# Patient Record
Sex: FEMALE | Race: WHITE | NOT HISPANIC OR LATINO
[De-identification: names, ages, dates, MRNs, and addresses within clinical notes are randomized per-mention and may not be internally consistent; named-entity substitution may affect disease eponyms.]

---

## 2017-12-08 ENCOUNTER — RESULT REVIEW (OUTPATIENT)
Age: 57
End: 2017-12-08

## 2018-04-18 ENCOUNTER — APPOINTMENT (OUTPATIENT)
Dept: GASTROENTEROLOGY | Facility: CLINIC | Age: 58
End: 2018-04-18
Payer: COMMERCIAL

## 2018-04-18 VITALS
BODY MASS INDEX: 18.31 KG/M2 | WEIGHT: 97 LBS | SYSTOLIC BLOOD PRESSURE: 116 MMHG | DIASTOLIC BLOOD PRESSURE: 84 MMHG | HEIGHT: 61 IN | HEART RATE: 57 BPM | OXYGEN SATURATION: 98 %

## 2018-04-18 DIAGNOSIS — K58.1 IRRITABLE BOWEL SYNDROME WITH CONSTIPATION: ICD-10-CM

## 2018-04-18 DIAGNOSIS — R14.0 ABDOMINAL DISTENSION (GASEOUS): ICD-10-CM

## 2018-04-18 PROCEDURE — 99204 OFFICE O/P NEW MOD 45 MIN: CPT

## 2018-04-24 ENCOUNTER — CHART COPY (OUTPATIENT)
Age: 58
End: 2018-04-24

## 2018-04-25 ENCOUNTER — CHART COPY (OUTPATIENT)
Age: 58
End: 2018-04-25

## 2018-06-29 ENCOUNTER — APPOINTMENT (OUTPATIENT)
Dept: GASTROENTEROLOGY | Facility: CLINIC | Age: 58
End: 2018-06-29

## 2018-09-27 ENCOUNTER — APPOINTMENT (OUTPATIENT)
Dept: GASTROENTEROLOGY | Facility: CLINIC | Age: 58
End: 2018-09-27

## 2019-04-25 ENCOUNTER — APPOINTMENT (OUTPATIENT)
Dept: GASTROENTEROLOGY | Facility: CLINIC | Age: 59
End: 2019-04-25
Payer: COMMERCIAL

## 2019-04-25 VITALS
BODY MASS INDEX: 18.12 KG/M2 | WEIGHT: 96 LBS | OXYGEN SATURATION: 98 % | DIASTOLIC BLOOD PRESSURE: 62 MMHG | HEIGHT: 61 IN | SYSTOLIC BLOOD PRESSURE: 98 MMHG | RESPIRATION RATE: 14 BRPM | HEART RATE: 67 BPM

## 2019-04-25 DIAGNOSIS — Z12.11 ENCOUNTER FOR SCREENING FOR MALIGNANT NEOPLASM OF COLON: ICD-10-CM

## 2019-04-25 DIAGNOSIS — K59.09 OTHER CONSTIPATION: ICD-10-CM

## 2019-04-25 PROCEDURE — 99214 OFFICE O/P EST MOD 30 MIN: CPT

## 2019-04-25 RX ORDER — SODIUM SULFATE, POTASSIUM SULFATE, MAGNESIUM SULFATE 17.5; 3.13; 1.6 G/ML; G/ML; G/ML
17.5-3.13-1.6 SOLUTION, CONCENTRATE ORAL
Qty: 1 | Refills: 0 | Status: ACTIVE | COMMUNITY
Start: 2019-04-25 | End: 1900-01-01

## 2019-04-25 RX ORDER — LINACLOTIDE 145 UG/1
145 CAPSULE, GELATIN COATED ORAL
Qty: 30 | Refills: 3 | Status: DISCONTINUED | COMMUNITY
Start: 2018-04-23 | End: 2019-04-25

## 2019-04-25 NOTE — REASON FOR VISIT
[Follow-Up: _____] : a [unfilled] follow-up visit [FreeTextEntry1] : Chronic constipation\par Screening evaluation

## 2019-04-25 NOTE — ASSESSMENT
[FreeTextEntry1] : Improved chronic constipation\par Average risk for colon cancer and polyps\par \par Plan\par Indications risks benefits and alternatives to screening colonoscopy reviewed\par Patient agreeable to examination\par Continue bowel regimen as above

## 2019-04-25 NOTE — HISTORY OF PRESENT ILLNESS
[de-identified] : 59-year-old female retained followup chronic constipation. Since the last office visit patient did attempt therapeutic trial linzess 145.  Cause diarrhea patient discontinued\par Since that time patient has continued to experiment with other remedies, recently having a great deal of success with daily magnesium supplementation 5 mg over-the-counter along with CBD/THC\par Reporting bowel movement every day\par No longer feeling gassy or bloated\par \par Last colonoscopy almost 10 years ago Dr Gonzales\par fair prep\par Redundant colon

## 2019-04-25 NOTE — PHYSICAL EXAM
[General Appearance - Alert] : alert [Sclera] : the sclera and conjunctiva were normal [General Appearance - In No Acute Distress] : in no acute distress [Extraocular Movements] : extraocular movements were intact [Outer Ear] : the ears and nose were normal in appearance [PERRL With Normal Accommodation] : pupils were equal in size, round, and reactive to light [Neck Cervical Mass (___cm)] : no neck mass was observed [Oropharynx] : the oropharynx was normal [Neck Appearance] : the appearance of the neck was normal [Jugular Venous Distention Increased] : there was no jugular-venous distention [Thyroid Diffuse Enlargement] : the thyroid was not enlarged [Thyroid Nodule] : there were no palpable thyroid nodules [Auscultation Breath Sounds / Voice Sounds] : lungs were clear to auscultation bilaterally [Heart Rate And Rhythm] : heart rate was normal and rhythm regular [Heart Sounds Gallop] : no gallops [Heart Sounds] : normal S1 and S2 [Murmurs] : no murmurs [Heart Sounds Pericardial Friction Rub] : no pericardial rub [Edema] : there was no peripheral edema [Abdomen Soft] : soft [Bowel Sounds] : normal bowel sounds [Abdomen Tenderness] : non-tender [Oriented To Time, Place, And Person] : oriented to person, place, and time [Abdomen Mass (___ Cm)] : no abdominal mass palpated [] : no hepato-splenomegaly [Affect] : the affect was normal [Impaired Insight] : insight and judgment were intact

## 2019-10-28 ENCOUNTER — APPOINTMENT (OUTPATIENT)
Dept: GASTROENTEROLOGY | Facility: HOSPITAL | Age: 59
End: 2019-10-28

## 2020-07-31 ENCOUNTER — APPOINTMENT (OUTPATIENT)
Dept: GASTROENTEROLOGY | Facility: CLINIC | Age: 60
End: 2020-07-31
Payer: COMMERCIAL

## 2020-07-31 VITALS
TEMPERATURE: 98.5 F | HEART RATE: 70 BPM | BODY MASS INDEX: 18.24 KG/M2 | DIASTOLIC BLOOD PRESSURE: 60 MMHG | WEIGHT: 96.6 LBS | OXYGEN SATURATION: 98 % | HEIGHT: 61 IN | SYSTOLIC BLOOD PRESSURE: 99 MMHG | RESPIRATION RATE: 16 BRPM

## 2020-07-31 DIAGNOSIS — R11.0 NAUSEA: ICD-10-CM

## 2020-07-31 PROCEDURE — 99214 OFFICE O/P EST MOD 30 MIN: CPT

## 2020-07-31 RX ORDER — FAMOTIDINE 40 MG/1
40 TABLET, FILM COATED ORAL
Qty: 30 | Refills: 0 | Status: ACTIVE | COMMUNITY
Start: 2020-07-31 | End: 1900-01-01

## 2020-07-31 NOTE — HISTORY OF PRESENT ILLNESS
[de-identified] : dictation inactive\par \par 60 yr female, IBS\par recent nasuea, improving\par chest fullness without burning, without dysphagia\par 5 pound weight loss\par \par BM ok with daily mg\par No longer using CBD

## 2020-07-31 NOTE — ASSESSMENT
[FreeTextEntry1] : nausea, chest fullness\par suspect reflux\par \par Plan\par trial of FDgard (samples given)\par If unsuccessful, trial famotidine 40mg (ordered)\par Phone follow up in several weeks

## 2020-07-31 NOTE — PHYSICAL EXAM
[General Appearance - In No Acute Distress] : in no acute distress [General Appearance - Alert] : alert [Sclera] : the sclera and conjunctiva were normal [Outer Ear] : the ears and nose were normal in appearance [PERRL With Normal Accommodation] : pupils were equal in size, round, and reactive to light [Extraocular Movements] : extraocular movements were intact [Oropharynx] : the oropharynx was normal [Neck Appearance] : the appearance of the neck was normal [Neck Cervical Mass (___cm)] : no neck mass was observed [Jugular Venous Distention Increased] : there was no jugular-venous distention [Thyroid Diffuse Enlargement] : the thyroid was not enlarged [Thyroid Nodule] : there were no palpable thyroid nodules [Heart Rate And Rhythm] : heart rate was normal and rhythm regular [Auscultation Breath Sounds / Voice Sounds] : lungs were clear to auscultation bilaterally [Heart Sounds Pericardial Friction Rub] : no pericardial rub [Murmurs] : no murmurs [Heart Sounds] : normal S1 and S2 [Heart Sounds Gallop] : no gallops [Bowel Sounds] : normal bowel sounds [Edema] : there was no peripheral edema [Abdomen Soft] : soft [] : no hepato-splenomegaly [Abdomen Mass (___ Cm)] : no abdominal mass palpated [Abdomen Tenderness] : non-tender [Affect] : the affect was normal [Oriented To Time, Place, And Person] : oriented to person, place, and time [Impaired Insight] : insight and judgment were intact

## 2020-12-21 PROBLEM — Z12.11 ENCOUNTER FOR SCREENING COLONOSCOPY: Status: RESOLVED | Noted: 2019-04-25 | Resolved: 2020-12-21

## 2023-01-03 ENCOUNTER — OFFICE (OUTPATIENT)
Dept: URBAN - METROPOLITAN AREA CLINIC 109 | Facility: CLINIC | Age: 63
Setting detail: OPHTHALMOLOGY
End: 2023-01-03
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
## 2023-01-03 PROCEDURE — NO SHOW FE NO SHOW FEE: Performed by: OPHTHALMOLOGY

## 2023-02-01 ENCOUNTER — OFFICE (OUTPATIENT)
Dept: URBAN - METROPOLITAN AREA CLINIC 109 | Facility: CLINIC | Age: 63
Setting detail: OPHTHALMOLOGY
End: 2023-02-01
Payer: COMMERCIAL

## 2023-02-01 DIAGNOSIS — H40.013: ICD-10-CM

## 2023-02-01 DIAGNOSIS — H16.223: ICD-10-CM

## 2023-02-01 PROCEDURE — 92250 FUNDUS PHOTOGRAPHY W/I&R: CPT | Performed by: OPHTHALMOLOGY

## 2023-02-01 PROCEDURE — 92014 COMPRE OPH EXAM EST PT 1/>: CPT | Performed by: OPHTHALMOLOGY

## 2023-02-01 ASSESSMENT — REFRACTION_AUTOREFRACTION
OS_SPHERE: +0.25
OD_SPHERE: +1.25
OD_CYLINDER: -0.75
OD_AXIS: 60

## 2023-02-01 ASSESSMENT — CONFRONTATIONAL VISUAL FIELD TEST (CVF)
OD_FINDINGS: FULL
OS_FINDINGS: FULL

## 2023-02-01 ASSESSMENT — PACHYMETRY
OD_CT_CORRECTION: -3
OD_CT_UM: 584
OS_CT_CORRECTION: -2
OS_CT_UM: 578

## 2023-02-01 ASSESSMENT — TONOMETRY
OD_IOP_MMHG: 17
OS_IOP_MMHG: 17

## 2023-02-01 ASSESSMENT — SUPERFICIAL PUNCTATE KERATITIS (SPK)
OD_SPK: 2+
OS_SPK: 3+

## 2023-02-01 ASSESSMENT — SPHEQUIV_DERIVED: OD_SPHEQUIV: 0.875

## 2023-02-01 ASSESSMENT — VISUAL ACUITY
OS_BCVA: 20/25-2
OD_BCVA: 20/20-2

## 2023-04-26 ENCOUNTER — OFFICE (OUTPATIENT)
Dept: URBAN - METROPOLITAN AREA CLINIC 109 | Facility: CLINIC | Age: 63
Setting detail: OPHTHALMOLOGY
End: 2023-04-26
Payer: COMMERCIAL

## 2023-04-26 DIAGNOSIS — H40.013: ICD-10-CM

## 2023-04-26 DIAGNOSIS — H02.89: ICD-10-CM

## 2023-04-26 DIAGNOSIS — H16.223: ICD-10-CM

## 2023-04-26 PROCEDURE — 92133 CPTRZD OPH DX IMG PST SGM ON: CPT | Performed by: OPHTHALMOLOGY

## 2023-04-26 PROCEDURE — 99213 OFFICE O/P EST LOW 20 MIN: CPT | Performed by: OPHTHALMOLOGY

## 2023-04-26 ASSESSMENT — SUPERFICIAL PUNCTATE KERATITIS (SPK)
OS_SPK: 3+
OD_SPK: 2+

## 2023-04-26 ASSESSMENT — PACHYMETRY
OS_CT_CORRECTION: -2
OD_CT_UM: 584
OS_CT_UM: 578
OD_CT_CORRECTION: -3

## 2023-04-26 ASSESSMENT — REFRACTION_AUTOREFRACTION
OD_AXIS: 60
OD_SPHERE: +1.25
OD_CYLINDER: -0.75
OS_SPHERE: +0.25

## 2023-04-26 ASSESSMENT — TONOMETRY
OD_IOP_MMHG: 16
OS_IOP_MMHG: 16

## 2023-04-26 ASSESSMENT — VISUAL ACUITY
OS_BCVA: 20/25-2
OD_BCVA: 20/20-2

## 2023-04-26 ASSESSMENT — CONFRONTATIONAL VISUAL FIELD TEST (CVF)
OD_FINDINGS: FULL
OS_FINDINGS: FULL

## 2023-04-26 ASSESSMENT — SPHEQUIV_DERIVED: OD_SPHEQUIV: 0.875

## 2024-01-02 ENCOUNTER — APPOINTMENT (OUTPATIENT)
Dept: OTOLARYNGOLOGY | Facility: CLINIC | Age: 64
End: 2024-01-02

## 2024-05-09 ENCOUNTER — EMERGENCY (EMERGENCY)
Facility: HOSPITAL | Age: 64
LOS: 1 days | Discharge: HOME | End: 2024-05-09
Attending: STUDENT IN AN ORGANIZED HEALTH CARE EDUCATION/TRAINING PROGRAM | Admitting: STUDENT IN AN ORGANIZED HEALTH CARE EDUCATION/TRAINING PROGRAM
Payer: COMMERCIAL

## 2024-05-09 VITALS
RESPIRATION RATE: 16 BRPM | HEIGHT: 61 IN | DIASTOLIC BLOOD PRESSURE: 87 MMHG | TEMPERATURE: 98 F | WEIGHT: 95.9 LBS | OXYGEN SATURATION: 100 % | HEART RATE: 73 BPM | SYSTOLIC BLOOD PRESSURE: 134 MMHG

## 2024-05-09 LAB
ALBUMIN SERPL ELPH-MCNC: 3.6 G/DL — SIGNIFICANT CHANGE UP (ref 3.3–5)
ALP SERPL-CCNC: 62 U/L — SIGNIFICANT CHANGE UP (ref 40–120)
ALT FLD-CCNC: 36 U/L — SIGNIFICANT CHANGE UP (ref 12–78)
ANION GAP SERPL CALC-SCNC: 5 MMOL/L — SIGNIFICANT CHANGE UP (ref 5–17)
AST SERPL-CCNC: 43 U/L — HIGH (ref 15–37)
BASOPHILS # BLD AUTO: 0.05 K/UL — SIGNIFICANT CHANGE UP (ref 0–0.2)
BASOPHILS NFR BLD AUTO: 0.9 % — SIGNIFICANT CHANGE UP (ref 0–2)
BILIRUB SERPL-MCNC: 0.4 MG/DL — SIGNIFICANT CHANGE UP (ref 0.2–1.2)
BUN SERPL-MCNC: 33 MG/DL — HIGH (ref 7–23)
CALCIUM SERPL-MCNC: 9.3 MG/DL — SIGNIFICANT CHANGE UP (ref 8.5–10.1)
CHLORIDE SERPL-SCNC: 104 MMOL/L — SIGNIFICANT CHANGE UP (ref 96–108)
CK SERPL-CCNC: 137 U/L — SIGNIFICANT CHANGE UP (ref 26–192)
CO2 SERPL-SCNC: 26 MMOL/L — SIGNIFICANT CHANGE UP (ref 22–31)
CREAT SERPL-MCNC: 0.93 MG/DL — SIGNIFICANT CHANGE UP (ref 0.5–1.3)
EGFR: 68 ML/MIN/1.73M2 — SIGNIFICANT CHANGE UP
EOSINOPHIL # BLD AUTO: 0.04 K/UL — SIGNIFICANT CHANGE UP (ref 0–0.5)
EOSINOPHIL NFR BLD AUTO: 0.7 % — SIGNIFICANT CHANGE UP (ref 0–6)
GLUCOSE SERPL-MCNC: 103 MG/DL — HIGH (ref 70–99)
HCT VFR BLD CALC: 44.3 % — SIGNIFICANT CHANGE UP (ref 34.5–45)
HGB BLD-MCNC: 15.1 G/DL — SIGNIFICANT CHANGE UP (ref 11.5–15.5)
IMM GRANULOCYTES NFR BLD AUTO: 0.5 % — SIGNIFICANT CHANGE UP (ref 0–0.9)
LYMPHOCYTES # BLD AUTO: 0.86 K/UL — LOW (ref 1–3.3)
LYMPHOCYTES # BLD AUTO: 14.7 % — SIGNIFICANT CHANGE UP (ref 13–44)
MCHC RBC-ENTMCNC: 32.5 PG — SIGNIFICANT CHANGE UP (ref 27–34)
MCHC RBC-ENTMCNC: 34.1 GM/DL — SIGNIFICANT CHANGE UP (ref 32–36)
MCV RBC AUTO: 95.3 FL — SIGNIFICANT CHANGE UP (ref 80–100)
MONOCYTES # BLD AUTO: 0.4 K/UL — SIGNIFICANT CHANGE UP (ref 0–0.9)
MONOCYTES NFR BLD AUTO: 6.8 % — SIGNIFICANT CHANGE UP (ref 2–14)
NEUTROPHILS # BLD AUTO: 4.46 K/UL — SIGNIFICANT CHANGE UP (ref 1.8–7.4)
NEUTROPHILS NFR BLD AUTO: 76.4 % — SIGNIFICANT CHANGE UP (ref 43–77)
NRBC # BLD: 0 /100 WBCS — SIGNIFICANT CHANGE UP (ref 0–0)
PLATELET # BLD AUTO: 180 K/UL — SIGNIFICANT CHANGE UP (ref 150–400)
POTASSIUM SERPL-MCNC: 4.4 MMOL/L — SIGNIFICANT CHANGE UP (ref 3.5–5.3)
POTASSIUM SERPL-SCNC: 4.4 MMOL/L — SIGNIFICANT CHANGE UP (ref 3.5–5.3)
PROT SERPL-MCNC: 7.2 G/DL — SIGNIFICANT CHANGE UP (ref 6–8.3)
RBC # BLD: 4.65 M/UL — SIGNIFICANT CHANGE UP (ref 3.8–5.2)
RBC # FLD: 12.2 % — SIGNIFICANT CHANGE UP (ref 10.3–14.5)
SODIUM SERPL-SCNC: 135 MMOL/L — SIGNIFICANT CHANGE UP (ref 135–145)
WBC # BLD: 5.84 K/UL — SIGNIFICANT CHANGE UP (ref 3.8–10.5)
WBC # FLD AUTO: 5.84 K/UL — SIGNIFICANT CHANGE UP (ref 3.8–10.5)

## 2024-05-09 PROCEDURE — 70450 CT HEAD/BRAIN W/O DYE: CPT | Mod: MC

## 2024-05-09 PROCEDURE — 99284 EMERGENCY DEPT VISIT MOD MDM: CPT | Mod: 25

## 2024-05-09 PROCEDURE — 73130 X-RAY EXAM OF HAND: CPT | Mod: 26,RT

## 2024-05-09 PROCEDURE — 86850 RBC ANTIBODY SCREEN: CPT

## 2024-05-09 PROCEDURE — 73552 X-RAY EXAM OF FEMUR 2/>: CPT | Mod: 26,50

## 2024-05-09 PROCEDURE — 71260 CT THORAX DX C+: CPT | Mod: 26,MC

## 2024-05-09 PROCEDURE — 85025 COMPLETE CBC W/AUTO DIFF WBC: CPT

## 2024-05-09 PROCEDURE — 73130 X-RAY EXAM OF HAND: CPT

## 2024-05-09 PROCEDURE — 73030 X-RAY EXAM OF SHOULDER: CPT | Mod: 26,LT

## 2024-05-09 PROCEDURE — 82550 ASSAY OF CK (CPK): CPT

## 2024-05-09 PROCEDURE — 74177 CT ABD & PELVIS W/CONTRAST: CPT | Mod: MC

## 2024-05-09 PROCEDURE — 72125 CT NECK SPINE W/O DYE: CPT | Mod: MC

## 2024-05-09 PROCEDURE — 73030 X-RAY EXAM OF SHOULDER: CPT

## 2024-05-09 PROCEDURE — 72125 CT NECK SPINE W/O DYE: CPT | Mod: 26,MC

## 2024-05-09 PROCEDURE — 71260 CT THORAX DX C+: CPT | Mod: MC

## 2024-05-09 PROCEDURE — 80053 COMPREHEN METABOLIC PANEL: CPT

## 2024-05-09 PROCEDURE — 99285 EMERGENCY DEPT VISIT HI MDM: CPT

## 2024-05-09 PROCEDURE — 36415 COLL VENOUS BLD VENIPUNCTURE: CPT

## 2024-05-09 PROCEDURE — 73552 X-RAY EXAM OF FEMUR 2/>: CPT

## 2024-05-09 PROCEDURE — 74177 CT ABD & PELVIS W/CONTRAST: CPT | Mod: 26,MC

## 2024-05-09 PROCEDURE — 86901 BLOOD TYPING SEROLOGIC RH(D): CPT

## 2024-05-09 PROCEDURE — 86900 BLOOD TYPING SEROLOGIC ABO: CPT

## 2024-05-09 PROCEDURE — 70450 CT HEAD/BRAIN W/O DYE: CPT | Mod: 26,MC

## 2024-05-09 RX ORDER — SODIUM CHLORIDE 9 MG/ML
1000 INJECTION INTRAMUSCULAR; INTRAVENOUS; SUBCUTANEOUS ONCE
Refills: 0 | Status: COMPLETED | OUTPATIENT
Start: 2024-05-09 | End: 2024-05-09

## 2024-05-09 RX ORDER — TETANUS TOXOID, REDUCED DIPHTHERIA TOXOID AND ACELLULAR PERTUSSIS VACCINE, ADSORBED 5; 2.5; 8; 8; 2.5 [IU]/.5ML; [IU]/.5ML; UG/.5ML; UG/.5ML; UG/.5ML
0.5 SUSPENSION INTRAMUSCULAR ONCE
Refills: 0 | Status: DISCONTINUED | OUTPATIENT
Start: 2024-05-09 | End: 2024-05-13

## 2024-05-09 RX ADMIN — SODIUM CHLORIDE 1000 MILLILITER(S): 9 INJECTION INTRAMUSCULAR; INTRAVENOUS; SUBCUTANEOUS at 19:32
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
## 2024-05-09 NOTE — ED ADULT NURSE REASSESSMENT NOTE - NS ED NURSE REASSESS COMMENT FT1
Pt received from (  Oscar ) nurse at this time. Pt A&Ox4 on room air. No complaints of pain or discomfort. No acute distress. Vitals remain stable. Tele monitor in place, visualized on monitor with tech. Oriented to room. Call bell within reach. Pt resting comfortably, will continue to monitor.
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
## 2024-05-09 NOTE — ED PROVIDER NOTE - IV ALTEPLASE EXCL REL HIDDEN
show
You can access the FollowMyHealth Patient Portal offered by [facility] by registering at the following website: [email or web address]. By joining [facility]’s FollowMyHealth portal, you will also be able to view your health information using other applications (apps) compatible with our system.

## 2024-05-09 NOTE — ED PROVIDER NOTE - PATIENT PORTAL LINK FT
You can access the FollowMyHealth Patient Portal offered by [facility] by registering at the following website: [email or web address]. By joining [facility]’s FollowMyHealth portal, you will also be able to view your health information using other applications (apps) compatible with our system.
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
## 2024-05-09 NOTE — ED ADULT TRIAGE NOTE - TEMPERATURE IN CELSIUS (DEGREES C)
Please let the patient know I reviewed her x-ray results and she does have some degenerative changes in the form of disc height loss and arthritis.  As discussed during our visit we will order a cervical spine MRI if her symptoms do not improve with physical therapy and gabapentin.   36.5

## 2024-05-09 NOTE — ED PROVIDER NOTE - CARE PROVIDER_API CALL
Ankita Cole George  Internal Medicine  [address] [place] [id number]  Phone: [phone]  Fax: [phone]  Follow Up Time: 4-6 Days

## 2024-05-09 NOTE — ED PROVIDER NOTE - WR INTERPRETATION DATE TIME  1
Cellcept Pregnancy And Lactation Text: This medication is Pregnancy Category D and isn't considered safe during pregnancy. It is unknown if this medication is excreted in breast milk. 09-May-2024 19:17

## 2024-05-09 NOTE — ED PROVIDER NOTE - PROGRESS NOTE DETAILS
Patient reevaluated, feels well at this time with no new pain.  Imaging negative for acute traumatic pathology.  Found to have concern for lymphoma.  Discussed with patient at bedside.  Message placed to cancer care direct group.  I discussed that x-ray reads will be official tomorrow morning but at this time I do not see any acute fracture.  Will plan for discharge at this time.  Plan to discharge patient. Return to ED precautions were discussed with the patient/family. All questions were answered. Maxwell Bear MD.

## 2024-05-09 NOTE — ED ADULT NURSE NOTE - NSFALLUNIVINTERV_ED_ALL_ED
Bed/Stretcher in lowest position, wheels locked, appropriate side rails in place/Call bell, personal items and telephone in reach/Instruct patient to call for assistance before getting out of bed/chair/stretcher/Non-slip footwear applied when patient is off stretcher/[place] to call system/Physically safe environment - no spills, clutter or unnecessary equipment/Purposeful proactive rounding/Room/bathroom lighting operational, light cord in reach

## 2024-05-13 ENCOUNTER — NON-APPOINTMENT (OUTPATIENT)
Age: 64
End: 2024-05-13

## 2024-05-17 ENCOUNTER — OUTPATIENT (OUTPATIENT)
Dept: OUTPATIENT SERVICES | Facility: HOSPITAL | Age: 64
LOS: 1 days | Discharge: HOME | End: 2024-05-17

## 2024-05-17 DIAGNOSIS — C85.90 NON-HODGKIN LYMPHOMA, UNSPECIFIED, UNSPECIFIED SITE: ICD-10-CM

## 2024-05-20 ENCOUNTER — RESULT REVIEW (OUTPATIENT)
Age: 64
End: 2024-05-20

## 2024-05-20 ENCOUNTER — APPOINTMENT (OUTPATIENT)
Dept: HEMATOLOGY ONCOLOGY | Facility: CLINIC | Age: 64
End: 2024-05-20
Payer: COMMERCIAL

## 2024-05-20 VITALS
OXYGEN SATURATION: 99 % | BODY MASS INDEX: 18.54 KG/M2 | DIASTOLIC BLOOD PRESSURE: 75 MMHG | SYSTOLIC BLOOD PRESSURE: 131 MMHG | WEIGHT: 98.08 LBS | TEMPERATURE: 97.2 F | RESPIRATION RATE: 17 BRPM | HEART RATE: 59 BPM

## 2024-05-20 LAB
ALBUMIN SERPL ELPH-MCNC: 4.4 G/DL
ALP BLD-CCNC: 73 U/L
ALT SERPL-CCNC: 23 U/L
ANION GAP SERPL CALC-SCNC: 12 MMOL/L
APTT BLD: 34 SEC
AST SERPL-CCNC: 24 U/L
B2 MICROGLOB SERPL-MCNC: 1.9 MG/L
BASOPHILS # BLD AUTO: 0.02 K/UL — SIGNIFICANT CHANGE UP (ref 0–0.2)
BASOPHILS NFR BLD AUTO: 0.5 % — SIGNIFICANT CHANGE UP (ref 0–2)
BILIRUB SERPL-MCNC: 0.2 MG/DL
BUN SERPL-MCNC: 22 MG/DL
CALCIUM SERPL-MCNC: 9.4 MG/DL
CHLORIDE SERPL-SCNC: 104 MMOL/L
CO2 SERPL-SCNC: 25 MMOL/L
CREAT SERPL-MCNC: 0.76 MG/DL
EGFR: 87 ML/MIN/1.73M2
EOSINOPHIL # BLD AUTO: 0.03 K/UL — SIGNIFICANT CHANGE UP (ref 0–0.5)
EOSINOPHIL NFR BLD AUTO: 0.8 % — SIGNIFICANT CHANGE UP (ref 0–6)
ERYTHROCYTE [SEDIMENTATION RATE] IN BLOOD BY WESTERGREN METHOD: 33 MM/HR
GLUCOSE SERPL-MCNC: 87 MG/DL
HCT VFR BLD CALC: 42.8 % — SIGNIFICANT CHANGE UP (ref 34.5–45)
HGB BLD-MCNC: 14.4 G/DL — SIGNIFICANT CHANGE UP (ref 11.5–15.5)
IMM GRANULOCYTES NFR BLD AUTO: 0 % — SIGNIFICANT CHANGE UP (ref 0–0.9)
INR PPP: 0.92 RATIO
LDH SERPL-CCNC: 270 U/L
LYMPHOCYTES # BLD AUTO: 0.7 K/UL — LOW (ref 1–3.3)
LYMPHOCYTES # BLD AUTO: 18.2 % — SIGNIFICANT CHANGE UP (ref 13–44)
MCHC RBC-ENTMCNC: 32.7 PG — SIGNIFICANT CHANGE UP (ref 27–34)
MCHC RBC-ENTMCNC: 33.6 G/DL — SIGNIFICANT CHANGE UP (ref 32–36)
MCV RBC AUTO: 97.1 FL — SIGNIFICANT CHANGE UP (ref 80–100)
MONOCYTES # BLD AUTO: 0.33 K/UL — SIGNIFICANT CHANGE UP (ref 0–0.9)
MONOCYTES NFR BLD AUTO: 8.6 % — SIGNIFICANT CHANGE UP (ref 2–14)
NEUTROPHILS # BLD AUTO: 2.76 K/UL — SIGNIFICANT CHANGE UP (ref 1.8–7.4)
NEUTROPHILS NFR BLD AUTO: 71.9 % — SIGNIFICANT CHANGE UP (ref 43–77)
NRBC # BLD: 0 /100 WBCS — SIGNIFICANT CHANGE UP (ref 0–0)
PLATELET # BLD AUTO: 198 K/UL — SIGNIFICANT CHANGE UP (ref 150–400)
POTASSIUM SERPL-SCNC: 4.1 MMOL/L
PROT SERPL-MCNC: 6.7 G/DL
PT BLD: 10.4 SEC
RBC # BLD: 4.41 M/UL — SIGNIFICANT CHANGE UP (ref 3.8–5.2)
RBC # FLD: 12.3 % — SIGNIFICANT CHANGE UP (ref 10.3–14.5)
SODIUM SERPL-SCNC: 140 MMOL/L
WBC # BLD: 3.84 K/UL — SIGNIFICANT CHANGE UP (ref 3.8–10.5)
WBC # FLD AUTO: 3.84 K/UL — SIGNIFICANT CHANGE UP (ref 3.8–10.5)

## 2024-05-20 PROCEDURE — 99205 OFFICE O/P NEW HI 60 MIN: CPT

## 2024-05-21 NOTE — PHYSICAL EXAM
[Fully active, able to carry on all pre-disease performance without restriction] : Status 0 - Fully active, able to carry on all pre-disease performance without restriction [Thin] : thin [Normal] : normal spine exam without palpable tenderness, no kyphosis or scoliosis

## 2024-05-23 NOTE — CONSULT LETTER
[Dear  ___] : Dear  [unfilled], [Consult Letter:] : I had the pleasure of evaluating your patient, [unfilled]. [Please see my note below.] : Please see my note below. [Consult Closing:] : Thank you very much for allowing me to participate in the care of this patient.  If you have any questions, please do not hesitate to contact me. [Sincerely,] : Sincerely, [FreeTextEntry2] : Ankita Cole MD [FreeTextEntry3] : LOYDA GARCIA M.D. Hematology/ Oncology [place] [address] Telephone: [phone] Fax: [phone]

## 2024-05-23 NOTE — REASON FOR VISIT
[Initial Consultation] : an initial consultation for [FreeTextEntry2] : intra-abdominal lymphadenopathy
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
## 2024-05-23 NOTE — ASSESSMENT
[FreeTextEntry1] : Ms. APODACA 's questions were answered to her satisfaction.  She  expressed her  understanding and willingness to comply with the above recommendations, and  will return to the office in 2 weeks.

## 2024-05-23 NOTE — REVIEW OF SYSTEMS
[Patient Intake Form Reviewed] : Patient intake form was reviewed [Diarrhea: Grade 0] : Diarrhea: Grade 0 [Negative] : Allergic/Immunologic [Recent Change In Weight] : ~T recent weight change [FreeTextEntry2] : Recent weight loss

## 2024-05-30 ENCOUNTER — APPOINTMENT (OUTPATIENT)
Dept: NUCLEAR MEDICINE | Facility: IMAGING CENTER | Age: 64
End: 2024-05-30
Payer: COMMERCIAL

## 2024-05-30 ENCOUNTER — OUTPATIENT (OUTPATIENT)
Dept: OUTPATIENT SERVICES | Facility: HOSPITAL | Age: 64
LOS: 1 days | End: 2024-05-30
Payer: COMMERCIAL

## 2024-05-30 DIAGNOSIS — C85.90 NON-HODGKIN LYMPHOMA, UNSPECIFIED, UNSPECIFIED SITE: ICD-10-CM

## 2024-05-30 PROCEDURE — 78815 PET IMAGE W/CT SKULL-THIGH: CPT | Mod: 26,PI

## 2024-05-30 PROCEDURE — 78815 PET IMAGE W/CT SKULL-THIGH: CPT

## 2024-05-30 PROCEDURE — A9552: CPT

## 2024-06-03 ENCOUNTER — NON-APPOINTMENT (OUTPATIENT)
Age: 64
End: 2024-06-03

## 2024-06-04 ENCOUNTER — LABORATORY RESULT (OUTPATIENT)
Age: 64
End: 2024-06-04

## 2024-06-04 ENCOUNTER — APPOINTMENT (OUTPATIENT)
Dept: HEMATOLOGY ONCOLOGY | Facility: CLINIC | Age: 64
End: 2024-06-04
Payer: COMMERCIAL

## 2024-06-04 ENCOUNTER — APPOINTMENT (OUTPATIENT)
Dept: HEMATOLOGY ONCOLOGY | Facility: CLINIC | Age: 64
End: 2024-06-04

## 2024-06-04 ENCOUNTER — APPOINTMENT (OUTPATIENT)
Dept: SURGICAL ONCOLOGY | Facility: CLINIC | Age: 64
End: 2024-06-04
Payer: COMMERCIAL

## 2024-06-04 VITALS
DIASTOLIC BLOOD PRESSURE: 85 MMHG | BODY MASS INDEX: 17.91 KG/M2 | WEIGHT: 94.8 LBS | SYSTOLIC BLOOD PRESSURE: 133 MMHG | RESPIRATION RATE: 16 BRPM | TEMPERATURE: 97.2 F | HEART RATE: 55 BPM | OXYGEN SATURATION: 98 %

## 2024-06-04 VITALS
HEIGHT: 61 IN | WEIGHT: 94 LBS | SYSTOLIC BLOOD PRESSURE: 107 MMHG | HEART RATE: 68 BPM | RESPIRATION RATE: 16 BRPM | BODY MASS INDEX: 17.75 KG/M2 | DIASTOLIC BLOOD PRESSURE: 67 MMHG | OXYGEN SATURATION: 97 %

## 2024-06-04 DIAGNOSIS — R59.1 GENERALIZED ENLARGED LYMPH NODES: ICD-10-CM

## 2024-06-04 PROCEDURE — 85097 BONE MARROW INTERPRETATION: CPT

## 2024-06-04 PROCEDURE — 99205 OFFICE O/P NEW HI 60 MIN: CPT
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
## 2024-06-04 NOTE — PHYSICAL EXAM
[Normal] : supple, no neck mass and thyroid not enlarged [Normal Neck Lymph Nodes] : normal neck lymph nodes  [Normal Supraclavicular Lymph Nodes] : normal supraclavicular lymph nodes [Normal Groin Lymph Nodes] : normal groin lymph nodes [Normal Axillary Lymph Nodes] : normal axillary lymph nodes [Normal] : oriented to person, place and time, with appropriate affect [de-identified] : Bilateral soft appearing axillar lymph nodes left greater than right.

## 2024-06-04 NOTE — ASSESSMENT
[FreeTextEntry1] :  We discussed the need for a left axillary lymph node excisional biopsy.  We discussed the risks, benefits and alternatives of the procedure.  We also discussed post operative expectations and possible complications.  Monica and her son express  understanding and agree to proceed.  We also discussed the consideration of a robotic mesenteric mass sampling if the results of the axillary lymph node biopsy were inconclusive    All medical entries were at my, Dr. Adán Voss, direction. I have reviewed the chart and agree that the record accurately reflects my personal performance of the history, physical exam, assessment, and plan.  Our office nurse practitioner was present for the duration of the office visit.

## 2024-06-04 NOTE — CONSULT LETTER
[Dear  ___] : Dear  [unfilled], [Consult Letter:] : I had the pleasure of evaluating your patient, [unfilled]. [Please see my note below.] : Please see my note below. [Consult Closing:] : Thank you very much for allowing me to participate in the care of this patient.  If you have any questions, please do not hesitate to contact me. [Sincerely,] : Sincerely, [FreeTextEntry2] : Tru Eugene MD [FreeTextEntry3] : Adán Voss MD Surgical Oncology [facility] Office: [phone] Cell: [phone]

## 2024-06-05 PROBLEM — R59.1 LYMPHADENOPATHY: Status: ACTIVE | Noted: 2024-06-04

## 2024-06-05 NOTE — REASON FOR VISIT
[Bone Marrow Biopsy] : bone marrow biopsy [Bone Marrow Aspiration] : bone marrow aspiration [Family Member] : family member

## 2024-06-05 NOTE — PROCEDURE
[Bone Marrow Biopsy] : bone marrow biopsy [Bone Marrow Aspiration] : bone marrow aspiration  [Patient] : the patient [Verbal Consent Obtained] : verbal consent was obtained prior to the procedure [Patient identification verified] : patient identification verified [Procedure verified and consent obtained] : procedure verified and consent obtained [Laterality verified and correct site marked] : laterality verified and correct site marked [Left] : site: left [Correct positioning] : correct positioning [Other ___] : [unfilled] [Right lateral decubitus position] : right lateral decubitus position [Superior iliac spine was identified] : the superior iliac spine was identified. [The left posterior iliac crest was prepped with betadine and draped, using sterile technique.] : The left posterior iliac crest was prepped with betadine and draped, using sterile technique. [Lidocaine was injected and into the periosteum overlying the site.] : Lidocaine was injected and into the periosteum overlying the site. [Aspirate] : aspirate [Cytogenetics] : cytogenetics [FISH] : FISH [Biopsy] : biopsy [Flow Cytometry] : flow cytometry [FreeTextEntry1] : intraabdominal lymphadenopathy

## 2024-06-06 ENCOUNTER — OUTPATIENT (OUTPATIENT)
Dept: OUTPATIENT SERVICES | Facility: HOSPITAL | Age: 64
LOS: 1 days | End: 2024-06-06

## 2024-06-06 VITALS
DIASTOLIC BLOOD PRESSURE: 72 MMHG | RESPIRATION RATE: 14 BRPM | OXYGEN SATURATION: 98 % | WEIGHT: 98.11 LBS | SYSTOLIC BLOOD PRESSURE: 111 MMHG | HEART RATE: 58 BPM | HEIGHT: 61 IN | TEMPERATURE: 98 F

## 2024-06-06 DIAGNOSIS — R59.1 GENERALIZED ENLARGED LYMPH NODES: ICD-10-CM

## 2024-06-06 DIAGNOSIS — Z98.890 OTHER SPECIFIED POSTPROCEDURAL STATES: Chronic | ICD-10-CM

## 2024-06-06 DIAGNOSIS — R59.0 LOCALIZED ENLARGED LYMPH NODES: ICD-10-CM

## 2024-06-06 RX ORDER — DEXTROSE MONOHYDRATE AND SODIUM CHLORIDE 5; .3 G/100ML; G/100ML
1000 INJECTION, SOLUTION INTRAVENOUS
Refills: 0 | Status: DISCONTINUED | OUTPATIENT
Start: 2024-06-18 | End: 2024-07-03
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
## 2024-06-06 NOTE — H&P PST ADULT - ATTENDING COMMENTS
D/w pt plan for left axillary LN excisional bx    Discussed r/b/a post op expectations poss complications.      Pt understands and agrees to proceed.

## 2024-06-06 NOTE — H&P PST ADULT - GASTROINTESTINAL
soft/nontender/nondistended/normal active bowel sounds/no guarding/no rigidity/no organomegaly/no palpable renetta/no masses palpable details…

## 2024-06-06 NOTE — H&P PST ADULT - LYMPHATICS COMMENTS
No anterior cervical lymphadenopathy No anterior cervical lymphadenopathy/ pre op dx- generalized enlarged lymph nodes
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
## 2024-06-06 NOTE — H&P PST ADULT - RESPIRATORY AND THORAX COMMENTS
patient reports of mild asthma, which was diagnosed 5-10 years ago by PCP and had given inhaler then- patient  never used.

## 2024-06-06 NOTE — H&P PST ADULT - NEGATIVE CARDIOVASCULAR SYMPTOMS
no chest pain/no palpitations/no dyspnea on exertion/no orthopnea/no paroxysmal nocturnal dyspnea/no peripheral edema None

## 2024-06-06 NOTE — H&P PST ADULT - ADDITIONAL PE
denies loose teeth   Patient had bottom permanent bridges  complete visualization of uvula- class 2- mallampati

## 2024-06-06 NOTE — H&P PST ADULT - PROBLEM SELECTOR PLAN 1
Patient is tentatively scheduled for left axillary excisional biopsy with frozen section and axillary lymphadenectomy with Dr Voss.      Pre-op instructions provided. Pt given verbal and written instructions with teach back on chlorhexidine wash and pepcid. Pt verbalized understanding with return demonstration.    Recent CBC,CMP results in sunrise- WNL -may/2024
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
## 2024-06-18 ENCOUNTER — APPOINTMENT (OUTPATIENT)
Dept: SURGICAL ONCOLOGY | Facility: HOSPITAL | Age: 64
End: 2024-06-18

## 2024-06-18 ENCOUNTER — OUTPATIENT (OUTPATIENT)
Dept: OUTPATIENT SERVICES | Facility: HOSPITAL | Age: 64
LOS: 1 days | Discharge: HOME | End: 2024-06-18
Payer: COMMERCIAL

## 2024-06-18 ENCOUNTER — RESULT REVIEW (OUTPATIENT)
Age: 64
End: 2024-06-18

## 2024-06-18 ENCOUNTER — TRANSCRIPTION ENCOUNTER (OUTPATIENT)
Age: 64
End: 2024-06-18

## 2024-06-18 VITALS
RESPIRATION RATE: 15 BRPM | TEMPERATURE: 98 F | HEIGHT: 61 IN | DIASTOLIC BLOOD PRESSURE: 79 MMHG | OXYGEN SATURATION: 100 % | WEIGHT: 98.11 LBS | HEART RATE: 61 BPM | SYSTOLIC BLOOD PRESSURE: 112 MMHG

## 2024-06-18 VITALS
HEART RATE: 57 BPM | RESPIRATION RATE: 15 BRPM | OXYGEN SATURATION: 100 % | SYSTOLIC BLOOD PRESSURE: 118 MMHG | DIASTOLIC BLOOD PRESSURE: 69 MMHG

## 2024-06-18 DIAGNOSIS — Z98.890 OTHER SPECIFIED POSTPROCEDURAL STATES: Chronic | ICD-10-CM

## 2024-06-18 DIAGNOSIS — R59.1 GENERALIZED ENLARGED LYMPH NODES: ICD-10-CM

## 2024-06-18 LAB
GRAM STN FLD: SIGNIFICANT CHANGE UP
SPECIMEN SOURCE: SIGNIFICANT CHANGE UP

## 2024-06-18 PROCEDURE — 88341 IMHCHEM/IMCYTCHM EA ADD ANTB: CPT | Mod: 26

## 2024-06-18 PROCEDURE — 38525 BIOPSY/REMOVAL LYMPH NODES: CPT | Mod: LT

## 2024-06-18 PROCEDURE — 88364 INSITU HYBRIDIZATION (FISH): CPT | Mod: 26

## 2024-06-18 PROCEDURE — 88189 FLOWCYTOMETRY/READ 16 & >: CPT | Mod: 59

## 2024-06-18 PROCEDURE — 88360 TUMOR IMMUNOHISTOCHEM/MANUAL: CPT | Mod: 26,59

## 2024-06-18 PROCEDURE — 88365 INSITU HYBRIDIZATION (FISH): CPT | Mod: 26

## 2024-06-18 PROCEDURE — 88342 IMHCHEM/IMCYTCHM 1ST ANTB: CPT | Mod: 26

## 2024-06-18 PROCEDURE — 88307 TISSUE EXAM BY PATHOLOGIST: CPT | Mod: 26

## 2024-06-18 PROCEDURE — 88291 CYTO/MOLECULAR REPORT: CPT

## 2024-06-18 DEVICE — ARISTA 3GR: Type: IMPLANTABLE DEVICE | Status: FUNCTIONAL

## 2024-06-18 RX ORDER — OXYCODONE HYDROCHLORIDE 100 MG/5ML
1 SOLUTION ORAL
Qty: 10 | Refills: 0
Start: 2024-06-18

## 2024-06-18 RX ORDER — KETOROLAC TROMETHAMINE 30 MG/ML
15 INJECTION, SOLUTION INTRAMUSCULAR ONCE
Refills: 0 | Status: DISCONTINUED | OUTPATIENT
Start: 2024-06-18 | End: 2024-06-18

## 2024-06-18 RX ORDER — FENTANYL CITRATE 50 UG/ML
25 INJECTION, SOLUTION INTRAMUSCULAR; INTRAVENOUS
Refills: 0 | Status: DISCONTINUED | OUTPATIENT
Start: 2024-06-18 | End: 2024-06-18

## 2024-06-18 RX ORDER — ONDANSETRON HYDROCHLORIDE 2 MG/ML
4 INJECTION INTRAMUSCULAR; INTRAVENOUS ONCE
Refills: 0 | Status: DISCONTINUED | OUTPATIENT
Start: 2024-06-18 | End: 2024-07-03

## 2024-06-18 RX ORDER — OXYCODONE HYDROCHLORIDE 100 MG/5ML
5 SOLUTION ORAL ONCE
Refills: 0 | Status: DISCONTINUED | OUTPATIENT
Start: 2024-06-18 | End: 2024-06-18

## 2024-06-18 RX ORDER — FEXOFENADINE HCL 30 MG
1 TABLET ORAL
Refills: 0 | DISCHARGE

## 2024-06-18 RX ADMIN — KETOROLAC TROMETHAMINE 15 MILLIGRAM(S): 30 INJECTION, SOLUTION INTRAMUSCULAR at 18:30

## 2024-06-18 RX ADMIN — KETOROLAC TROMETHAMINE 15 MILLIGRAM(S): 30 INJECTION, SOLUTION INTRAMUSCULAR at 18:07

## 2024-06-19 PROBLEM — J18.9 PNEUMONIA, UNSPECIFIED ORGANISM: Chronic | Status: ACTIVE | Noted: 2024-06-06

## 2024-06-19 PROBLEM — Z87.19 PERSONAL HISTORY OF OTHER DISEASES OF THE DIGESTIVE SYSTEM: Chronic | Status: ACTIVE | Noted: 2024-06-06

## 2024-06-19 PROBLEM — K58.9 IRRITABLE BOWEL SYNDROME, UNSPECIFIED: Chronic | Status: ACTIVE | Noted: 2024-06-06

## 2024-06-19 PROBLEM — J45.909 UNSPECIFIED ASTHMA, UNCOMPLICATED: Chronic | Status: ACTIVE | Noted: 2024-06-06

## 2024-06-19 PROBLEM — Z87.09 PERSONAL HISTORY OF OTHER DISEASES OF THE RESPIRATORY SYSTEM: Chronic | Status: ACTIVE | Noted: 2024-06-06

## 2024-06-19 PROBLEM — L30.9 DERMATITIS, UNSPECIFIED: Chronic | Status: ACTIVE | Noted: 2024-06-06
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
## 2024-06-19 PROBLEM — J30.2 OTHER SEASONAL ALLERGIC RHINITIS: Chronic | Status: ACTIVE | Noted: 2024-06-06

## 2024-06-19 PROBLEM — K58.9 IRRITABLE BOWEL SYNDROME WITHOUT DIARRHEA: Chronic | Status: ACTIVE | Noted: 2024-06-06

## 2024-06-19 LAB
NIGHT BLUE STAIN TISS: SIGNIFICANT CHANGE UP
SPECIMEN SOURCE: SIGNIFICANT CHANGE UP

## 2024-06-23 LAB
CULTURE RESULTS: SIGNIFICANT CHANGE UP
SPECIMEN SOURCE: SIGNIFICANT CHANGE UP

## 2024-06-24 LAB
HEMATOPATHOLOGY REPORT: SIGNIFICANT CHANGE UP
TM INTERPRETATION: SIGNIFICANT CHANGE UP

## 2024-06-28 ENCOUNTER — APPOINTMENT (OUTPATIENT)
Dept: HEMATOLOGY ONCOLOGY | Facility: CLINIC | Age: 64
End: 2024-06-28
Payer: COMMERCIAL

## 2024-06-28 VITALS
HEART RATE: 57 BPM | TEMPERATURE: 97.3 F | SYSTOLIC BLOOD PRESSURE: 98 MMHG | OXYGEN SATURATION: 99 % | RESPIRATION RATE: 16 BRPM | WEIGHT: 95.68 LBS | DIASTOLIC BLOOD PRESSURE: 63 MMHG | BODY MASS INDEX: 18.08 KG/M2

## 2024-06-28 DIAGNOSIS — C85.90 NON-HODGKIN LYMPHOMA, UNSPECIFIED, UNSPECIFIED SITE: ICD-10-CM

## 2024-06-28 PROCEDURE — 99215 OFFICE O/P EST HI 40 MIN: CPT

## 2024-06-30 PROBLEM — C85.90 LYMPHOMA, UNSPECIFIED BODY REGION, UNSPECIFIED LYMPHOMA TYPE: Status: ACTIVE | Noted: 2024-05-20

## 2024-07-01 ENCOUNTER — TRANSCRIPTION ENCOUNTER (OUTPATIENT)
Age: 64
End: 2024-07-01

## 2024-07-03 ENCOUNTER — APPOINTMENT (OUTPATIENT)
Dept: HEMATOLOGY ONCOLOGY | Facility: CLINIC | Age: 64
End: 2024-07-03

## 2024-07-05 LAB — CHROM ANALY OVERALL INTERP SPEC-IMP: SIGNIFICANT CHANGE UP

## 2024-07-08 ENCOUNTER — APPOINTMENT (OUTPATIENT)
Dept: SURGICAL ONCOLOGY | Facility: CLINIC | Age: 64
End: 2024-07-08
Payer: COMMERCIAL

## 2024-07-08 ENCOUNTER — RESULT REVIEW (OUTPATIENT)
Age: 64
End: 2024-07-08

## 2024-07-08 ENCOUNTER — APPOINTMENT (OUTPATIENT)
Dept: HEMATOLOGY ONCOLOGY | Facility: CLINIC | Age: 64
End: 2024-07-08

## 2024-07-08 VITALS
HEIGHT: 61 IN | BODY MASS INDEX: 17.75 KG/M2 | OXYGEN SATURATION: 98 % | HEART RATE: 63 BPM | WEIGHT: 94 LBS | DIASTOLIC BLOOD PRESSURE: 65 MMHG | SYSTOLIC BLOOD PRESSURE: 111 MMHG

## 2024-07-08 DIAGNOSIS — C85.90 NON-HODGKIN LYMPHOMA, UNSPECIFIED, UNSPECIFIED SITE: ICD-10-CM

## 2024-07-08 LAB
BASOPHILS # BLD AUTO: 0.04 K/UL — SIGNIFICANT CHANGE UP (ref 0–0.2)
BASOPHILS NFR BLD AUTO: 0.9 % — SIGNIFICANT CHANGE UP (ref 0–2)
EOSINOPHIL # BLD AUTO: 0.05 K/UL — SIGNIFICANT CHANGE UP (ref 0–0.5)
EOSINOPHIL NFR BLD AUTO: 1.1 % — SIGNIFICANT CHANGE UP (ref 0–6)
ERYTHROCYTE [SEDIMENTATION RATE] IN BLOOD: 7 MM/HR — SIGNIFICANT CHANGE UP (ref 0–20)
HCT VFR BLD CALC: 42.9 % — SIGNIFICANT CHANGE UP (ref 34.5–45)
HGB BLD-MCNC: 14.5 G/DL — SIGNIFICANT CHANGE UP (ref 11.5–15.5)
IMM GRANULOCYTES NFR BLD AUTO: 0.2 % — SIGNIFICANT CHANGE UP (ref 0–0.9)
LYMPHOCYTES # BLD AUTO: 0.82 K/UL — LOW (ref 1–3.3)
LYMPHOCYTES # BLD AUTO: 17.5 % — SIGNIFICANT CHANGE UP (ref 13–44)
MCHC RBC-ENTMCNC: 32.7 PG — SIGNIFICANT CHANGE UP (ref 27–34)
MCHC RBC-ENTMCNC: 33.8 G/DL — SIGNIFICANT CHANGE UP (ref 32–36)
MCV RBC AUTO: 96.6 FL — SIGNIFICANT CHANGE UP (ref 80–100)
MONOCYTES # BLD AUTO: 0.31 K/UL — SIGNIFICANT CHANGE UP (ref 0–0.9)
MONOCYTES NFR BLD AUTO: 6.6 % — SIGNIFICANT CHANGE UP (ref 2–14)
NEUTROPHILS # BLD AUTO: 3.46 K/UL — SIGNIFICANT CHANGE UP (ref 1.8–7.4)
NEUTROPHILS NFR BLD AUTO: 73.7 % — SIGNIFICANT CHANGE UP (ref 43–77)
NRBC # BLD: 0 /100 WBCS — SIGNIFICANT CHANGE UP (ref 0–0)
PLATELET # BLD AUTO: 178 K/UL — SIGNIFICANT CHANGE UP (ref 150–400)
RBC # BLD: 4.44 M/UL — SIGNIFICANT CHANGE UP (ref 3.8–5.2)
RBC # FLD: 12.7 % — SIGNIFICANT CHANGE UP (ref 10.3–14.5)
WBC # BLD: 4.69 K/UL — SIGNIFICANT CHANGE UP (ref 3.8–10.5)
WBC # FLD AUTO: 4.69 K/UL — SIGNIFICANT CHANGE UP (ref 3.8–10.5)

## 2024-07-08 PROCEDURE — 99024 POSTOP FOLLOW-UP VISIT: CPT

## 2024-07-09 LAB
ALBUMIN SERPL ELPH-MCNC: 4.3 G/DL
ALP BLD-CCNC: 62 U/L
ALT SERPL-CCNC: 24 U/L
AST SERPL-CCNC: 24 U/L
B2 MICROGLOB SERPL-MCNC: 1.7 MG/L
BILIRUB SERPL-MCNC: 0.2 MG/DL
BUN SERPL-MCNC: 29 MG/DL
CALCIUM SERPL-MCNC: 9.4 MG/DL
CHLORIDE SERPL-SCNC: 103 MMOL/L
CREAT SERPL-MCNC: 0.86 MG/DL
EGFR: 75 ML/MIN/1.73M2
GLUCOSE SERPL-MCNC: 100 MG/DL
HBV CORE IGG+IGM SER QL: NONREACTIVE
HBV SURFACE AB SER QL: NONREACTIVE
HBV SURFACE AG SER QL: NONREACTIVE
HEPB DNA PCR INT: NOT DETECTED
HEPB DNA PCR LOG: NOT DETECTED LOGIU/ML
LDH SERPL-CCNC: 215 U/L
POTASSIUM SERPL-SCNC: 4.1 MMOL/L
PROT SERPL-MCNC: 6.4 G/DL
SODIUM SERPL-SCNC: 140 MMOL/L
TSH SERPL-ACNC: 1.44 UIU/ML

## 2024-07-10 ENCOUNTER — RX ONLY (RX ONLY)
Age: 64
End: 2024-07-10

## 2024-07-10 ENCOUNTER — OFFICE (OUTPATIENT)
Dept: URBAN - METROPOLITAN AREA CLINIC 109 | Facility: CLINIC | Age: 64
Setting detail: OPHTHALMOLOGY
End: 2024-07-10
Payer: COMMERCIAL

## 2024-07-10 DIAGNOSIS — H02.89: ICD-10-CM

## 2024-07-10 DIAGNOSIS — H40.013: ICD-10-CM

## 2024-07-10 DIAGNOSIS — H16.223: ICD-10-CM

## 2024-07-10 DIAGNOSIS — H25.13: ICD-10-CM

## 2024-07-10 PROCEDURE — 92020 GONIOSCOPY: CPT | Performed by: OPHTHALMOLOGY

## 2024-07-10 PROCEDURE — 92012 INTRM OPH EXAM EST PATIENT: CPT | Performed by: OPHTHALMOLOGY

## 2024-07-10 PROCEDURE — 92083 EXTENDED VISUAL FIELD XM: CPT | Performed by: OPHTHALMOLOGY

## 2024-07-10 PROCEDURE — 92133 CPTRZD OPH DX IMG PST SGM ON: CPT | Performed by: OPHTHALMOLOGY

## 2024-07-10 ASSESSMENT — CONFRONTATIONAL VISUAL FIELD TEST (CVF)
OS_FINDINGS: FULL
OD_FINDINGS: FULL

## 2024-07-12 ENCOUNTER — NON-APPOINTMENT (OUTPATIENT)
Age: 64
End: 2024-07-12

## 2024-07-17 LAB
CULTURE RESULTS: SIGNIFICANT CHANGE UP
SPECIMEN SOURCE: SIGNIFICANT CHANGE UP

## 2024-07-19 ENCOUNTER — OUTPATIENT (OUTPATIENT)
Dept: OUTPATIENT SERVICES | Facility: HOSPITAL | Age: 64
LOS: 1 days | Discharge: HOME | End: 2024-07-19

## 2024-07-19 DIAGNOSIS — Z98.890 OTHER SPECIFIED POSTPROCEDURAL STATES: Chronic | ICD-10-CM

## 2024-07-19 DIAGNOSIS — C85.90 NON-HODGKIN LYMPHOMA, UNSPECIFIED, UNSPECIFIED SITE: ICD-10-CM

## 2024-07-22 RX ORDER — ONDANSETRON 8 MG/1
8 TABLET, ORALLY DISINTEGRATING ORAL EVERY 8 HOURS
Qty: 24 | Refills: 2 | Status: ACTIVE | COMMUNITY
Start: 2024-07-22 | End: 1900-01-01

## 2024-07-22 RX ORDER — PROCHLORPERAZINE MALEATE 10 MG/1
10 TABLET ORAL EVERY 6 HOURS
Qty: 24 | Refills: 4 | Status: ACTIVE | COMMUNITY
Start: 2024-07-22 | End: 1900-01-01

## 2024-07-24 ENCOUNTER — NON-APPOINTMENT (OUTPATIENT)
Age: 64
End: 2024-07-24

## 2024-07-25 ENCOUNTER — APPOINTMENT (OUTPATIENT)
Dept: HEMATOLOGY ONCOLOGY | Facility: CLINIC | Age: 64
End: 2024-07-25
Payer: COMMERCIAL

## 2024-07-25 ENCOUNTER — APPOINTMENT (OUTPATIENT)
Dept: HEMATOLOGY ONCOLOGY | Facility: CLINIC | Age: 64
End: 2024-07-25

## 2024-07-25 ENCOUNTER — NON-APPOINTMENT (OUTPATIENT)
Age: 64
End: 2024-07-25

## 2024-07-25 DIAGNOSIS — C82.90 FOLLICULAR LYMPHOMA, UNSPECIFIED, UNSPECIFIED SITE: ICD-10-CM

## 2024-07-25 PROCEDURE — 99443: CPT

## 2024-07-27 PROBLEM — C82.90 FOLLICULAR LYMPHOMA, UNSPECIFIED FOLLICULAR LYMPHOMA TYPE, UNSPECIFIED BODY REGION: Status: ACTIVE | Noted: 2024-07-24

## 2024-07-27 NOTE — ASSESSMENT
Digital Medicine: Health  Introduction    Introduced Jomar Conley to Digital Medicine. Discussed health  role and recommended lifestyle modifications.    The history is provided by the patient.           Additional Enrollment Details: He is not currently taking any antihypertensive medication. He prefers not to take medication but is open to it if it is needed.     He drinks 2 cups coffee in AM and typically takes readings in evening.    He states he has received notice that [employer] is not connected properly. However, we are receiving his readings. He will look into it, I encouraged him to contact me if he is still receiving notices.     HYPERTENSION  Explained hypertension digital medicine goals including BP goal less than or equal to 130/80mmHg, improved convenience of BP management and reduced risk of heart attack, kidney failure, stroke, eye disease, dementia, and death.      Explained non-pharmacologic therapies like low salt diet and physical activity can reduce blood pressure. .      Explained that we expect patient to submit several blood pressure readings per week at random times of the day, but at least 30 minutes after taking blood pressure medications. Instructed patient not to allow anyone else to use their blood pressure monitor and phone as data submitted is directly entered into medical record. Reviewed and confirmed appropriate blood pressure monitoring technique.         Explained to the patient that the Digital Medicine team is not available for emergencies. Advised patient call [facility] ([phone] or [phone]) or [phone] if needed.               Diet-Assessed      Additional diet details: He states that weight gain has contributed to elevated BP. He is following a low carb diet for weight loss and states he's been successful with it in the past.     Physical Activity-Not assessed    Medication Adherence-Medication Adherence not addressed.      Substance, Sleep,  [FreeTextEntry1] : Ms. APODACA 's questions were answered to her satisfaction.  She  expressed her  understanding and willingness to comply with the above recommendations, and  will return to the office in 3 weeks.  Stress-Not assessed      Additional monitoring needed.  Continue current diet/physical activity routine.  Reviewed Device Techniques.     Addressed any questions or concerns and patient has my contact information if needed prior to next outreach. Patient verbalizes understanding.      Sent link to [facility] [employer] [facility] webpages and my contact information via [employer] for future questions.        Explained to the patient that the Digital Medicine team is not available for emergencies. Advised patient call [facility] ([phone] or [phone]) or [phone] if needed.           Topic    Lipid (Cholesterol) Test        Last 5 Patient Entered Readings                                      Current 30 Day Average: 139/87     Recent Readings 8/17/2020 8/16/2020 8/15/2020 8/14/2020 8/14/2020    SBP (mmHg) 145 131 139 141 151    DBP (mmHg) 84 86 87 78 98    Pulse 68 69 62 72 79

## 2024-07-27 NOTE — ASSESSMENT
[FreeTextEntry1] : Ms. APODACA 's questions were answered to her satisfaction.  She  expressed her  understanding and willingness to comply with the above recommendations, and  will return to the office in 3 weeks.

## 2024-07-27 NOTE — REASON FOR VISIT
[Family Member] : family member [Follow-Up Visit] : a follow-up visit for [FreeTextEntry2] : follicular lymphoma
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
## 2024-07-27 NOTE — REVIEW OF SYSTEMS
[Recent Change In Weight] : ~T recent weight change [Diarrhea: Grade 0] : Diarrhea: Grade 0 [Negative] : Psychiatric [FreeTextEntry2] :  weight loss
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
## 2024-07-27 NOTE — REASON FOR VISIT
[Follow-Up Visit] : a follow-up visit for [Family Member] : family member [FreeTextEntry2] : follicular lymphoma
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
## 2024-08-01 ENCOUNTER — RESULT REVIEW (OUTPATIENT)
Age: 64
End: 2024-08-01

## 2024-08-01 ENCOUNTER — NON-APPOINTMENT (OUTPATIENT)
Age: 64
End: 2024-08-01

## 2024-08-01 ENCOUNTER — APPOINTMENT (OUTPATIENT)
Dept: INFUSION THERAPY | Facility: HOSPITAL | Age: 64
End: 2024-08-01

## 2024-08-01 ENCOUNTER — APPOINTMENT (OUTPATIENT)
Dept: HEMATOLOGY ONCOLOGY | Facility: CLINIC | Age: 64
End: 2024-08-01

## 2024-08-01 LAB
BASOPHILS # BLD AUTO: 0.04 K/UL — SIGNIFICANT CHANGE UP (ref 0–0.2)
BASOPHILS NFR BLD AUTO: 0.7 % — SIGNIFICANT CHANGE UP (ref 0–2)
EOSINOPHIL # BLD AUTO: 0.04 K/UL — SIGNIFICANT CHANGE UP (ref 0–0.5)
EOSINOPHIL NFR BLD AUTO: 0.7 % — SIGNIFICANT CHANGE UP (ref 0–6)
HCT VFR BLD CALC: 40.4 % — SIGNIFICANT CHANGE UP (ref 34.5–45)
HGB BLD-MCNC: 14 G/DL — SIGNIFICANT CHANGE UP (ref 11.5–15.5)
IMM GRANULOCYTES NFR BLD AUTO: 0.4 % — SIGNIFICANT CHANGE UP (ref 0–0.9)
LYMPHOCYTES # BLD AUTO: 0.96 K/UL — LOW (ref 1–3.3)
LYMPHOCYTES # BLD AUTO: 17.8 % — SIGNIFICANT CHANGE UP (ref 13–44)
MCHC RBC-ENTMCNC: 32.5 PG — SIGNIFICANT CHANGE UP (ref 27–34)
MCHC RBC-ENTMCNC: 34.7 G/DL — SIGNIFICANT CHANGE UP (ref 32–36)
MCV RBC AUTO: 93.7 FL — SIGNIFICANT CHANGE UP (ref 80–100)
MONOCYTES # BLD AUTO: 0.47 K/UL — SIGNIFICANT CHANGE UP (ref 0–0.9)
MONOCYTES NFR BLD AUTO: 8.7 % — SIGNIFICANT CHANGE UP (ref 2–14)
NEUTROPHILS # BLD AUTO: 3.87 K/UL — SIGNIFICANT CHANGE UP (ref 1.8–7.4)
NEUTROPHILS NFR BLD AUTO: 71.7 % — SIGNIFICANT CHANGE UP (ref 43–77)
NRBC # BLD: 0 /100 WBCS — SIGNIFICANT CHANGE UP (ref 0–0)
PLATELET # BLD AUTO: 156 K/UL — SIGNIFICANT CHANGE UP (ref 150–400)
RBC # BLD: 4.31 M/UL — SIGNIFICANT CHANGE UP (ref 3.8–5.2)
RBC # FLD: 12.6 % — SIGNIFICANT CHANGE UP (ref 10.3–14.5)
WBC # BLD: 5.4 K/UL — SIGNIFICANT CHANGE UP (ref 3.8–10.5)
WBC # FLD AUTO: 5.4 K/UL — SIGNIFICANT CHANGE UP (ref 3.8–10.5)

## 2024-08-01 PROCEDURE — 93010 ELECTROCARDIOGRAM REPORT: CPT

## 2024-08-02 ENCOUNTER — APPOINTMENT (OUTPATIENT)
Dept: INFUSION THERAPY | Facility: HOSPITAL | Age: 64
End: 2024-08-02

## 2024-08-02 ENCOUNTER — NON-APPOINTMENT (OUTPATIENT)
Age: 64
End: 2024-08-02

## 2024-08-02 DIAGNOSIS — C82.90 FOLLICULAR LYMPHOMA, UNSPECIFIED, UNSPECIFIED SITE: ICD-10-CM

## 2024-08-02 DIAGNOSIS — R11.2 NAUSEA WITH VOMITING, UNSPECIFIED: ICD-10-CM

## 2024-08-02 DIAGNOSIS — Z51.11 ENCOUNTER FOR ANTINEOPLASTIC CHEMOTHERAPY: ICD-10-CM

## 2024-08-05 ENCOUNTER — NON-APPOINTMENT (OUTPATIENT)
Age: 64
End: 2024-08-05

## 2024-08-13 ENCOUNTER — NON-APPOINTMENT (OUTPATIENT)
Age: 64
End: 2024-08-13

## 2024-08-19 ENCOUNTER — APPOINTMENT (OUTPATIENT)
Dept: CARDIOLOGY | Facility: CLINIC | Age: 64
End: 2024-08-19
Payer: COMMERCIAL

## 2024-08-19 VITALS
BODY MASS INDEX: 18.31 KG/M2 | OXYGEN SATURATION: 98 % | DIASTOLIC BLOOD PRESSURE: 68 MMHG | SYSTOLIC BLOOD PRESSURE: 109 MMHG | HEART RATE: 67 BPM | HEIGHT: 61 IN | WEIGHT: 97 LBS

## 2024-08-19 DIAGNOSIS — R06.00 DYSPNEA, UNSPECIFIED: ICD-10-CM

## 2024-08-19 DIAGNOSIS — E78.5 HYPERLIPIDEMIA, UNSPECIFIED: ICD-10-CM

## 2024-08-19 PROCEDURE — 99204 OFFICE O/P NEW MOD 45 MIN: CPT

## 2024-08-19 PROCEDURE — G2211 COMPLEX E/M VISIT ADD ON: CPT

## 2024-08-19 PROCEDURE — 93000 ELECTROCARDIOGRAM COMPLETE: CPT

## 2024-08-19 NOTE — CARDIOLOGY SUMMARY
[de-identified] : 8/19/24 -sinus rhythm at a rate of 68 bpm.  Nonspecific poor R wave progression in leads V1-V2.

## 2024-08-19 NOTE — DISCUSSION/SUMMARY
[FreeTextEntry1] : Mrs. Kim has cardiovascular risk factors, including a reported mild dyslipidemia and a reported family history of a myocardial infarction in her father.  She reports having had a cardiac CT calcium scoring study performed in April 2024, which she states revealed a total coronary artery calcium score of 0.  She has infrequently experienced a brief sensation of dyspnea while blowing up a balloon, however, she has not experienced dyspnea in association with any of her activities, which include swimming and bicycle riding.  She does not describe having experienced any signs or symptoms which would be considered typical for an anginal syndrome, congestive heart failure, or a hemodynamically-compromising arrhythmia.  Her cardiac examination today is unremarkable.  Her blood pressure reading today is normal.  Her electrocardiogram today reveals sinus rhythm at a rate of 68 bpm with nonspecific poor R wave progression in leads V1-V2.  In view of her cardiovascular risk factors and symptom of dyspnea, I am referring the patient for exercise stress testing to evaluate for the presence of exercised-induced myocardial ischemia.  Echocardiography will be performed to evaluate cardiac structural integrity, left ventricular function, valvular morphology and function, and the pulmonary artery systolic pressure.  The patient is going to make arrangements to have these studies performed through our office, and I will telephone her to discuss the findings, once the studies have been completed.  The patient is going to forward copies of the reports of her most recent fasting lipid profile and the cardiac CT calcium scoring study to my office for my review.  The importance of proper dietary habits, proper weight maintenance, and regular exercise was discussed with the patient today.  I have asked the patient to call me if she should have any questions or problems pertaining to these matters, and especially if she should experience any concerning symptoms.  Further recommendations regarding cardiac evaluation and/or treatment will be considered, pending the patient's clinical course, as well as the findings on the upcoming cardiac studies. [EKG obtained to assist in diagnosis and management of assessed problem(s)] : EKG obtained to assist in diagnosis and management of assessed problem(s)

## 2024-08-19 NOTE — PHYSICAL EXAM
[Well Developed] : well developed [No Acute Distress] : no acute distress [Normal Conjunctiva] : normal conjunctiva [No Carotid Bruit] : no carotid bruit [Normal S1, S2] : normal S1, S2 [No Murmur] : no murmur [No Rub] : no rub [No Gallop] : no gallop [Clear Lung Fields] : clear lung fields [No Respiratory Distress] : no respiratory distress  [Soft] : abdomen soft [Non Tender] : non-tender [Normal Gait] : normal gait [No Edema] : no edema [No Rash] : no rash [Moves all extremities] : moves all extremities [Alert and Oriented] : alert and oriented [de-identified] : No JVD is appreciated at a 45 degree angle
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
## 2024-08-29 ENCOUNTER — APPOINTMENT (OUTPATIENT)
Dept: HEMATOLOGY ONCOLOGY | Facility: CLINIC | Age: 64
End: 2024-08-29
Payer: COMMERCIAL

## 2024-08-29 ENCOUNTER — RESULT REVIEW (OUTPATIENT)
Age: 64
End: 2024-08-29

## 2024-08-29 ENCOUNTER — APPOINTMENT (OUTPATIENT)
Dept: INFUSION THERAPY | Facility: HOSPITAL | Age: 64
End: 2024-08-29

## 2024-08-29 DIAGNOSIS — C85.90 NON-HODGKIN LYMPHOMA, UNSPECIFIED, UNSPECIFIED SITE: ICD-10-CM

## 2024-08-29 DIAGNOSIS — C82.90 FOLLICULAR LYMPHOMA, UNSPECIFIED, UNSPECIFIED SITE: ICD-10-CM

## 2024-08-29 LAB
BASOPHILS # BLD AUTO: 0.09 K/UL — SIGNIFICANT CHANGE UP (ref 0–0.2)
BASOPHILS NFR BLD AUTO: 1.6 % — SIGNIFICANT CHANGE UP (ref 0–2)
EOSINOPHIL # BLD AUTO: 0.1 K/UL — SIGNIFICANT CHANGE UP (ref 0–0.5)
EOSINOPHIL NFR BLD AUTO: 1.8 % — SIGNIFICANT CHANGE UP (ref 0–6)
HCT VFR BLD CALC: 40.8 % — SIGNIFICANT CHANGE UP (ref 34.5–45)
HGB BLD-MCNC: 14.1 G/DL — SIGNIFICANT CHANGE UP (ref 11.5–15.5)
IMM GRANULOCYTES NFR BLD AUTO: 1.3 % — HIGH (ref 0–0.9)
LYMPHOCYTES # BLD AUTO: 0.91 K/UL — LOW (ref 1–3.3)
LYMPHOCYTES # BLD AUTO: 16.4 % — SIGNIFICANT CHANGE UP (ref 13–44)
MCHC RBC-ENTMCNC: 32.6 PG — SIGNIFICANT CHANGE UP (ref 27–34)
MCHC RBC-ENTMCNC: 34.6 G/DL — SIGNIFICANT CHANGE UP (ref 32–36)
MCV RBC AUTO: 94.4 FL — SIGNIFICANT CHANGE UP (ref 80–100)
MONOCYTES # BLD AUTO: 0.51 K/UL — SIGNIFICANT CHANGE UP (ref 0–0.9)
MONOCYTES NFR BLD AUTO: 9.2 % — SIGNIFICANT CHANGE UP (ref 2–14)
NEUTROPHILS # BLD AUTO: 3.86 K/UL — SIGNIFICANT CHANGE UP (ref 1.8–7.4)
NEUTROPHILS NFR BLD AUTO: 69.7 % — SIGNIFICANT CHANGE UP (ref 43–77)
NRBC # BLD: 0 /100 WBCS — SIGNIFICANT CHANGE UP (ref 0–0)
PLATELET # BLD AUTO: 172 K/UL — SIGNIFICANT CHANGE UP (ref 150–400)
RBC # BLD: 4.32 M/UL — SIGNIFICANT CHANGE UP (ref 3.8–5.2)
RBC # FLD: 12.4 % — SIGNIFICANT CHANGE UP (ref 10.3–14.5)
WBC # BLD: 5.54 K/UL — SIGNIFICANT CHANGE UP (ref 3.8–10.5)
WBC # FLD AUTO: 5.54 K/UL — SIGNIFICANT CHANGE UP (ref 3.8–10.5)

## 2024-08-29 PROCEDURE — 99214 OFFICE O/P EST MOD 30 MIN: CPT
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
## 2024-08-29 NOTE — ASSESSMENT
[FreeTextEntry1] : Ms. APODACA 's questions were answered to her satisfaction.  She  expressed her  understanding and willingness to comply with the above recommendations, and  will return to the office in 4 weeks.

## 2024-08-30 ENCOUNTER — APPOINTMENT (OUTPATIENT)
Dept: INFUSION THERAPY | Facility: HOSPITAL | Age: 64
End: 2024-08-30

## 2024-09-12 ENCOUNTER — OUTPATIENT (OUTPATIENT)
Dept: OUTPATIENT SERVICES | Facility: HOSPITAL | Age: 64
LOS: 1 days | Discharge: HOME | End: 2024-09-12

## 2024-09-12 DIAGNOSIS — C85.90 NON-HODGKIN LYMPHOMA, UNSPECIFIED, UNSPECIFIED SITE: ICD-10-CM

## 2024-09-12 DIAGNOSIS — Z98.890 OTHER SPECIFIED POSTPROCEDURAL STATES: Chronic | ICD-10-CM

## 2024-09-19 ENCOUNTER — APPOINTMENT (OUTPATIENT)
Dept: HEMATOLOGY ONCOLOGY | Facility: CLINIC | Age: 64
End: 2024-09-19
Payer: COMMERCIAL

## 2024-09-19 PROCEDURE — 99214 OFFICE O/P EST MOD 30 MIN: CPT
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
## 2024-09-26 ENCOUNTER — APPOINTMENT (OUTPATIENT)
Dept: INFUSION THERAPY | Facility: HOSPITAL | Age: 64
End: 2024-09-26

## 2024-09-26 ENCOUNTER — RESULT REVIEW (OUTPATIENT)
Age: 64
End: 2024-09-26

## 2024-09-26 ENCOUNTER — APPOINTMENT (OUTPATIENT)
Dept: HEMATOLOGY ONCOLOGY | Facility: CLINIC | Age: 64
End: 2024-09-26

## 2024-09-26 DIAGNOSIS — C82.90 FOLLICULAR LYMPHOMA, UNSPECIFIED, UNSPECIFIED SITE: ICD-10-CM

## 2024-09-26 LAB
ALBUMIN SERPL ELPH-MCNC: 4 G/DL — SIGNIFICANT CHANGE UP (ref 3.3–5)
ALP SERPL-CCNC: 53 U/L — SIGNIFICANT CHANGE UP (ref 40–120)
ALT FLD-CCNC: 28 U/L — SIGNIFICANT CHANGE UP (ref 10–45)
ANION GAP SERPL CALC-SCNC: 16 MMOL/L — SIGNIFICANT CHANGE UP (ref 5–17)
AST SERPL-CCNC: 34 U/L — SIGNIFICANT CHANGE UP (ref 10–40)
BASOPHILS # BLD AUTO: 0.07 K/UL — SIGNIFICANT CHANGE UP (ref 0–0.2)
BASOPHILS NFR BLD AUTO: 2.1 % — HIGH (ref 0–2)
BILIRUB SERPL-MCNC: 0.2 MG/DL — SIGNIFICANT CHANGE UP (ref 0.2–1.2)
BUN SERPL-MCNC: 24 MG/DL — HIGH (ref 7–23)
CALCIUM SERPL-MCNC: 9.6 MG/DL — SIGNIFICANT CHANGE UP (ref 8.4–10.5)
CHLORIDE SERPL-SCNC: 100 MMOL/L — SIGNIFICANT CHANGE UP (ref 96–108)
CO2 SERPL-SCNC: 18 MMOL/L — LOW (ref 22–31)
CREAT SERPL-MCNC: 0.69 MG/DL — SIGNIFICANT CHANGE UP (ref 0.5–1.3)
EGFR: 96 ML/MIN/1.73M2 — SIGNIFICANT CHANGE UP
EOSINOPHIL # BLD AUTO: 0.18 K/UL — SIGNIFICANT CHANGE UP (ref 0–0.5)
EOSINOPHIL NFR BLD AUTO: 5.3 % — SIGNIFICANT CHANGE UP (ref 0–6)
GLUCOSE SERPL-MCNC: 81 MG/DL — SIGNIFICANT CHANGE UP (ref 70–99)
HCT VFR BLD CALC: 39.7 % — SIGNIFICANT CHANGE UP (ref 34.5–45)
HGB BLD-MCNC: 13.7 G/DL — SIGNIFICANT CHANGE UP (ref 11.5–15.5)
IMM GRANULOCYTES NFR BLD AUTO: 0.3 % — SIGNIFICANT CHANGE UP (ref 0–0.9)
LYMPHOCYTES # BLD AUTO: 0.51 K/UL — LOW (ref 1–3.3)
LYMPHOCYTES # BLD AUTO: 15 % — SIGNIFICANT CHANGE UP (ref 13–44)
MCHC RBC-ENTMCNC: 32.4 PG — SIGNIFICANT CHANGE UP (ref 27–34)
MCHC RBC-ENTMCNC: 34.5 G/DL — SIGNIFICANT CHANGE UP (ref 32–36)
MCV RBC AUTO: 93.9 FL — SIGNIFICANT CHANGE UP (ref 80–100)
MONOCYTES # BLD AUTO: 0.56 K/UL — SIGNIFICANT CHANGE UP (ref 0–0.9)
MONOCYTES NFR BLD AUTO: 16.5 % — HIGH (ref 2–14)
NEUTROPHILS # BLD AUTO: 2.07 K/UL — SIGNIFICANT CHANGE UP (ref 1.8–7.4)
NEUTROPHILS NFR BLD AUTO: 60.8 % — SIGNIFICANT CHANGE UP (ref 43–77)
NRBC # BLD: 0 /100 WBCS — SIGNIFICANT CHANGE UP (ref 0–0)
PLATELET # BLD AUTO: 169 K/UL — SIGNIFICANT CHANGE UP (ref 150–400)
POTASSIUM SERPL-MCNC: 4.6 MMOL/L — SIGNIFICANT CHANGE UP (ref 3.5–5.3)
POTASSIUM SERPL-SCNC: 4.6 MMOL/L — SIGNIFICANT CHANGE UP (ref 3.5–5.3)
PROT SERPL-MCNC: 6.6 G/DL — SIGNIFICANT CHANGE UP (ref 6–8.3)
RBC # BLD: 4.23 M/UL — SIGNIFICANT CHANGE UP (ref 3.8–5.2)
RBC # FLD: 12.3 % — SIGNIFICANT CHANGE UP (ref 10.3–14.5)
SODIUM SERPL-SCNC: 134 MMOL/L — LOW (ref 135–145)
WBC # BLD: 3.4 K/UL — LOW (ref 3.8–10.5)
WBC # FLD AUTO: 3.4 K/UL — LOW (ref 3.8–10.5)

## 2024-09-27 ENCOUNTER — NON-APPOINTMENT (OUTPATIENT)
Age: 64
End: 2024-09-27

## 2024-09-27 ENCOUNTER — APPOINTMENT (OUTPATIENT)
Dept: INFUSION THERAPY | Facility: HOSPITAL | Age: 64
End: 2024-09-27

## 2024-09-27 DIAGNOSIS — C82.90 FOLLICULAR LYMPHOMA, UNSPECIFIED, UNSPECIFIED SITE: ICD-10-CM

## 2024-09-27 DIAGNOSIS — Z51.11 ENCOUNTER FOR ANTINEOPLASTIC CHEMOTHERAPY: ICD-10-CM

## 2024-09-27 DIAGNOSIS — R11.2 NAUSEA WITH VOMITING, UNSPECIFIED: ICD-10-CM

## 2024-10-08 ENCOUNTER — APPOINTMENT (OUTPATIENT)
Dept: HEMATOLOGY ONCOLOGY | Facility: CLINIC | Age: 64
End: 2024-10-08

## 2024-10-08 DIAGNOSIS — C82.90 FOLLICULAR LYMPHOMA, UNSPECIFIED, UNSPECIFIED SITE: ICD-10-CM

## 2024-10-22 ENCOUNTER — APPOINTMENT (OUTPATIENT)
Dept: HEMATOLOGY ONCOLOGY | Facility: CLINIC | Age: 64
End: 2024-10-22
Payer: COMMERCIAL

## 2024-10-22 VITALS
SYSTOLIC BLOOD PRESSURE: 115 MMHG | RESPIRATION RATE: 16 BRPM | BODY MASS INDEX: 18.33 KG/M2 | WEIGHT: 96.98 LBS | HEART RATE: 64 BPM | DIASTOLIC BLOOD PRESSURE: 76 MMHG | TEMPERATURE: 97.4 F | OXYGEN SATURATION: 97 %

## 2024-10-22 PROCEDURE — 99214 OFFICE O/P EST MOD 30 MIN: CPT

## 2024-10-24 ENCOUNTER — RESULT REVIEW (OUTPATIENT)
Age: 64
End: 2024-10-24

## 2024-10-24 ENCOUNTER — APPOINTMENT (OUTPATIENT)
Dept: INFUSION THERAPY | Facility: HOSPITAL | Age: 64
End: 2024-10-24

## 2024-10-24 ENCOUNTER — NON-APPOINTMENT (OUTPATIENT)
Age: 64
End: 2024-10-24

## 2024-10-24 ENCOUNTER — APPOINTMENT (OUTPATIENT)
Dept: HEMATOLOGY ONCOLOGY | Facility: CLINIC | Age: 64
End: 2024-10-24

## 2024-10-24 DIAGNOSIS — C82.90 FOLLICULAR LYMPHOMA, UNSPECIFIED, UNSPECIFIED SITE: ICD-10-CM

## 2024-10-24 LAB
ALBUMIN SERPL ELPH-MCNC: 3.9 G/DL — SIGNIFICANT CHANGE UP (ref 3.3–5)
ALP SERPL-CCNC: 50 U/L — SIGNIFICANT CHANGE UP (ref 40–120)
ALT FLD-CCNC: 25 U/L — SIGNIFICANT CHANGE UP (ref 10–45)
ANION GAP SERPL CALC-SCNC: 12 MMOL/L — SIGNIFICANT CHANGE UP (ref 5–17)
AST SERPL-CCNC: 36 U/L — SIGNIFICANT CHANGE UP (ref 10–40)
BASOPHILS # BLD AUTO: 0.05 K/UL — SIGNIFICANT CHANGE UP (ref 0–0.2)
BASOPHILS NFR BLD AUTO: 1.8 % — SIGNIFICANT CHANGE UP (ref 0–2)
BILIRUB SERPL-MCNC: 0.3 MG/DL — SIGNIFICANT CHANGE UP (ref 0.2–1.2)
BUN SERPL-MCNC: 19 MG/DL — SIGNIFICANT CHANGE UP (ref 7–23)
CALCIUM SERPL-MCNC: 9.6 MG/DL — SIGNIFICANT CHANGE UP (ref 8.4–10.5)
CHLORIDE SERPL-SCNC: 101 MMOL/L — SIGNIFICANT CHANGE UP (ref 96–108)
CO2 SERPL-SCNC: 24 MMOL/L — SIGNIFICANT CHANGE UP (ref 22–31)
CREAT SERPL-MCNC: 0.68 MG/DL — SIGNIFICANT CHANGE UP (ref 0.5–1.3)
EGFR: 97 ML/MIN/1.73M2 — SIGNIFICANT CHANGE UP
EOSINOPHIL # BLD AUTO: 0.07 K/UL — SIGNIFICANT CHANGE UP (ref 0–0.5)
EOSINOPHIL NFR BLD AUTO: 2.5 % — SIGNIFICANT CHANGE UP (ref 0–6)
GLUCOSE SERPL-MCNC: 72 MG/DL — SIGNIFICANT CHANGE UP (ref 70–99)
HCT VFR BLD CALC: 38.5 % — SIGNIFICANT CHANGE UP (ref 34.5–45)
HGB BLD-MCNC: 13.3 G/DL — SIGNIFICANT CHANGE UP (ref 11.5–15.5)
IMM GRANULOCYTES NFR BLD AUTO: 0.4 % — SIGNIFICANT CHANGE UP (ref 0–0.9)
LYMPHOCYTES # BLD AUTO: 0.35 K/UL — LOW (ref 1–3.3)
LYMPHOCYTES # BLD AUTO: 12.5 % — LOW (ref 13–44)
MCHC RBC-ENTMCNC: 32.4 PG — SIGNIFICANT CHANGE UP (ref 27–34)
MCHC RBC-ENTMCNC: 34.5 G/DL — SIGNIFICANT CHANGE UP (ref 32–36)
MCV RBC AUTO: 93.9 FL — SIGNIFICANT CHANGE UP (ref 80–100)
MONOCYTES # BLD AUTO: 0.45 K/UL — SIGNIFICANT CHANGE UP (ref 0–0.9)
MONOCYTES NFR BLD AUTO: 16.1 % — HIGH (ref 2–14)
NEUTROPHILS # BLD AUTO: 1.86 K/UL — SIGNIFICANT CHANGE UP (ref 1.8–7.4)
NEUTROPHILS NFR BLD AUTO: 66.7 % — SIGNIFICANT CHANGE UP (ref 43–77)
NRBC # BLD: 0 /100 WBCS — SIGNIFICANT CHANGE UP (ref 0–0)
PLATELET # BLD AUTO: 163 K/UL — SIGNIFICANT CHANGE UP (ref 150–400)
POTASSIUM SERPL-MCNC: 4.4 MMOL/L — SIGNIFICANT CHANGE UP (ref 3.5–5.3)
POTASSIUM SERPL-SCNC: 4.4 MMOL/L — SIGNIFICANT CHANGE UP (ref 3.5–5.3)
PROT SERPL-MCNC: 6.6 G/DL — SIGNIFICANT CHANGE UP (ref 6–8.3)
RBC # BLD: 4.1 M/UL — SIGNIFICANT CHANGE UP (ref 3.8–5.2)
RBC # FLD: 12.8 % — SIGNIFICANT CHANGE UP (ref 10.3–14.5)
SODIUM SERPL-SCNC: 137 MMOL/L — SIGNIFICANT CHANGE UP (ref 135–145)
WBC # BLD: 2.79 K/UL — LOW (ref 3.8–10.5)
WBC # FLD AUTO: 2.79 K/UL — LOW (ref 3.8–10.5)

## 2024-10-25 ENCOUNTER — APPOINTMENT (OUTPATIENT)
Dept: INFUSION THERAPY | Facility: HOSPITAL | Age: 64
End: 2024-10-25

## 2024-11-13 ENCOUNTER — RESULT REVIEW (OUTPATIENT)
Age: 64
End: 2024-11-13

## 2024-11-13 ENCOUNTER — APPOINTMENT (OUTPATIENT)
Dept: NUCLEAR MEDICINE | Facility: IMAGING CENTER | Age: 64
End: 2024-11-13

## 2024-11-13 ENCOUNTER — APPOINTMENT (OUTPATIENT)
Dept: ULTRASOUND IMAGING | Facility: IMAGING CENTER | Age: 64
End: 2024-11-13

## 2024-11-13 ENCOUNTER — OUTPATIENT (OUTPATIENT)
Dept: OUTPATIENT SERVICES | Facility: HOSPITAL | Age: 64
LOS: 1 days | End: 2024-11-13
Payer: COMMERCIAL

## 2024-11-13 DIAGNOSIS — C82.90 FOLLICULAR LYMPHOMA, UNSPECIFIED, UNSPECIFIED SITE: ICD-10-CM

## 2024-11-13 DIAGNOSIS — Z98.890 OTHER SPECIFIED POSTPROCEDURAL STATES: Chronic | ICD-10-CM

## 2024-11-13 PROCEDURE — 78815 PET IMAGE W/CT SKULL-THIGH: CPT | Mod: 26,PS

## 2024-11-13 PROCEDURE — 76937 US GUIDE VASCULAR ACCESS: CPT | Mod: 26

## 2024-11-13 PROCEDURE — 36410 VNPNXR 3YR/> PHY/QHP DX/THER: CPT

## 2024-11-13 PROCEDURE — A9552: CPT

## 2024-11-13 PROCEDURE — 78815 PET IMAGE W/CT SKULL-THIGH: CPT

## 2024-11-13 PROCEDURE — 76937 US GUIDE VASCULAR ACCESS: CPT

## 2024-11-15 ENCOUNTER — OUTPATIENT (OUTPATIENT)
Dept: OUTPATIENT SERVICES | Facility: HOSPITAL | Age: 64
LOS: 1 days | Discharge: HOME | End: 2024-11-15

## 2024-11-15 DIAGNOSIS — Z98.890 OTHER SPECIFIED POSTPROCEDURAL STATES: Chronic | ICD-10-CM

## 2024-11-15 DIAGNOSIS — C85.90 NON-HODGKIN LYMPHOMA, UNSPECIFIED, UNSPECIFIED SITE: ICD-10-CM

## 2024-11-21 ENCOUNTER — APPOINTMENT (OUTPATIENT)
Dept: HEMATOLOGY ONCOLOGY | Facility: CLINIC | Age: 64
End: 2024-11-21

## 2024-11-21 ENCOUNTER — RESULT REVIEW (OUTPATIENT)
Age: 64
End: 2024-11-21

## 2024-11-21 ENCOUNTER — APPOINTMENT (OUTPATIENT)
Dept: INFUSION THERAPY | Facility: HOSPITAL | Age: 64
End: 2024-11-21

## 2024-11-21 ENCOUNTER — NON-APPOINTMENT (OUTPATIENT)
Age: 64
End: 2024-11-21

## 2024-11-21 LAB
BASOPHILS # BLD AUTO: 0.04 K/UL — SIGNIFICANT CHANGE UP (ref 0–0.2)
BASOPHILS NFR BLD AUTO: 1.3 % — SIGNIFICANT CHANGE UP (ref 0–2)
EOSINOPHIL # BLD AUTO: 0.07 K/UL — SIGNIFICANT CHANGE UP (ref 0–0.5)
EOSINOPHIL NFR BLD AUTO: 2.3 % — SIGNIFICANT CHANGE UP (ref 0–6)
HCT VFR BLD CALC: 39.6 % — SIGNIFICANT CHANGE UP (ref 34.5–45)
HGB BLD-MCNC: 13.7 G/DL — SIGNIFICANT CHANGE UP (ref 11.5–15.5)
IMM GRANULOCYTES NFR BLD AUTO: 0.3 % — SIGNIFICANT CHANGE UP (ref 0–0.9)
LYMPHOCYTES # BLD AUTO: 1.32 K/UL — SIGNIFICANT CHANGE UP (ref 1–3.3)
LYMPHOCYTES # BLD AUTO: 43.3 % — SIGNIFICANT CHANGE UP (ref 13–44)
MCHC RBC-ENTMCNC: 32.5 PG — SIGNIFICANT CHANGE UP (ref 27–34)
MCHC RBC-ENTMCNC: 34.6 G/DL — SIGNIFICANT CHANGE UP (ref 32–36)
MCV RBC AUTO: 93.8 FL — SIGNIFICANT CHANGE UP (ref 80–100)
MONOCYTES # BLD AUTO: 0.66 K/UL — SIGNIFICANT CHANGE UP (ref 0–0.9)
MONOCYTES NFR BLD AUTO: 21.6 % — HIGH (ref 2–14)
NEUTROPHILS # BLD AUTO: 0.95 K/UL — LOW (ref 1.8–7.4)
NEUTROPHILS NFR BLD AUTO: 31.2 % — LOW (ref 43–77)
NRBC # BLD: 0 /100 WBCS — SIGNIFICANT CHANGE UP (ref 0–0)
PLATELET # BLD AUTO: 176 K/UL — SIGNIFICANT CHANGE UP (ref 150–400)
RBC # BLD: 4.22 M/UL — SIGNIFICANT CHANGE UP (ref 3.8–5.2)
RBC # FLD: 13 % — SIGNIFICANT CHANGE UP (ref 10.3–14.5)
WBC # BLD: 3.05 K/UL — LOW (ref 3.8–10.5)
WBC # FLD AUTO: 3.05 K/UL — LOW (ref 3.8–10.5)

## 2024-11-22 ENCOUNTER — APPOINTMENT (OUTPATIENT)
Dept: INFUSION THERAPY | Facility: HOSPITAL | Age: 64
End: 2024-11-22

## 2024-11-22 ENCOUNTER — APPOINTMENT (OUTPATIENT)
Dept: HEMATOLOGY ONCOLOGY | Facility: CLINIC | Age: 64
End: 2024-11-22
Payer: COMMERCIAL

## 2024-11-22 DIAGNOSIS — C82.90 FOLLICULAR LYMPHOMA, UNSPECIFIED, UNSPECIFIED SITE: ICD-10-CM

## 2024-11-22 DIAGNOSIS — Z51.11 ENCOUNTER FOR ANTINEOPLASTIC CHEMOTHERAPY: ICD-10-CM

## 2024-11-22 DIAGNOSIS — R11.2 NAUSEA WITH VOMITING, UNSPECIFIED: ICD-10-CM

## 2024-11-22 PROCEDURE — 99215 OFFICE O/P EST HI 40 MIN: CPT

## 2024-12-17 ENCOUNTER — APPOINTMENT (OUTPATIENT)
Dept: HEMATOLOGY ONCOLOGY | Facility: CLINIC | Age: 64
End: 2024-12-17

## 2024-12-17 ENCOUNTER — RESULT REVIEW (OUTPATIENT)
Age: 64
End: 2024-12-17

## 2024-12-17 LAB
BASOPHILS # BLD AUTO: 0.04 K/UL — SIGNIFICANT CHANGE UP (ref 0–0.2)
BASOPHILS NFR BLD AUTO: 1.2 % — SIGNIFICANT CHANGE UP (ref 0–2)
EOSINOPHIL # BLD AUTO: 0.04 K/UL — SIGNIFICANT CHANGE UP (ref 0–0.5)
EOSINOPHIL NFR BLD AUTO: 1.2 % — SIGNIFICANT CHANGE UP (ref 0–6)
HCT VFR BLD CALC: 38.7 % — SIGNIFICANT CHANGE UP (ref 34.5–45)
HGB BLD-MCNC: 13.9 G/DL — SIGNIFICANT CHANGE UP (ref 11.5–15.5)
IMM GRANULOCYTES NFR BLD AUTO: 0.3 % — SIGNIFICANT CHANGE UP (ref 0–0.9)
LYMPHOCYTES # BLD AUTO: 0.57 K/UL — LOW (ref 1–3.3)
LYMPHOCYTES # BLD AUTO: 17.5 % — SIGNIFICANT CHANGE UP (ref 13–44)
MCHC RBC-ENTMCNC: 33.6 PG — SIGNIFICANT CHANGE UP (ref 27–34)
MCHC RBC-ENTMCNC: 35.9 G/DL — SIGNIFICANT CHANGE UP (ref 32–36)
MCV RBC AUTO: 93.5 FL — SIGNIFICANT CHANGE UP (ref 80–100)
MONOCYTES # BLD AUTO: 0.47 K/UL — SIGNIFICANT CHANGE UP (ref 0–0.9)
MONOCYTES NFR BLD AUTO: 14.5 % — HIGH (ref 2–14)
NEUTROPHILS # BLD AUTO: 2.12 K/UL — SIGNIFICANT CHANGE UP (ref 1.8–7.4)
NEUTROPHILS NFR BLD AUTO: 65.3 % — SIGNIFICANT CHANGE UP (ref 43–77)
NRBC # BLD: 0 /100 WBCS — SIGNIFICANT CHANGE UP (ref 0–0)
PLATELET # BLD AUTO: 173 K/UL — SIGNIFICANT CHANGE UP (ref 150–400)
RBC # BLD: 4.14 M/UL — SIGNIFICANT CHANGE UP (ref 3.8–5.2)
RBC # FLD: 12.4 % — SIGNIFICANT CHANGE UP (ref 10.3–14.5)
WBC # BLD: 3.25 K/UL — LOW (ref 3.8–10.5)
WBC # FLD AUTO: 3.25 K/UL — LOW (ref 3.8–10.5)

## 2024-12-19 ENCOUNTER — APPOINTMENT (OUTPATIENT)
Dept: INFUSION THERAPY | Facility: HOSPITAL | Age: 64
End: 2024-12-19

## 2024-12-19 ENCOUNTER — APPOINTMENT (OUTPATIENT)
Dept: HEMATOLOGY ONCOLOGY | Facility: CLINIC | Age: 64
End: 2024-12-19
Payer: COMMERCIAL

## 2024-12-19 DIAGNOSIS — C82.90 FOLLICULAR LYMPHOMA, UNSPECIFIED, UNSPECIFIED SITE: ICD-10-CM

## 2024-12-19 PROCEDURE — 99215 OFFICE O/P EST HI 40 MIN: CPT

## 2024-12-20 ENCOUNTER — APPOINTMENT (OUTPATIENT)
Dept: INFUSION THERAPY | Facility: HOSPITAL | Age: 64
End: 2024-12-20

## 2025-01-22 ENCOUNTER — OUTPATIENT (OUTPATIENT)
Dept: OUTPATIENT SERVICES | Facility: HOSPITAL | Age: 65
LOS: 1 days | Discharge: HOME | End: 2025-01-22

## 2025-01-22 DIAGNOSIS — Z98.890 OTHER SPECIFIED POSTPROCEDURAL STATES: Chronic | ICD-10-CM

## 2025-01-22 DIAGNOSIS — C85.90 NON-HODGKIN LYMPHOMA, UNSPECIFIED, UNSPECIFIED SITE: ICD-10-CM

## 2025-01-24 ENCOUNTER — APPOINTMENT (OUTPATIENT)
Dept: HEMATOLOGY ONCOLOGY | Facility: CLINIC | Age: 65
End: 2025-01-24
Payer: COMMERCIAL

## 2025-01-24 ENCOUNTER — NON-APPOINTMENT (OUTPATIENT)
Age: 65
End: 2025-01-24

## 2025-01-24 VITALS
DIASTOLIC BLOOD PRESSURE: 72 MMHG | WEIGHT: 95.46 LBS | BODY MASS INDEX: 18.04 KG/M2 | OXYGEN SATURATION: 97 % | HEART RATE: 69 BPM | SYSTOLIC BLOOD PRESSURE: 107 MMHG | RESPIRATION RATE: 16 BRPM | TEMPERATURE: 97 F

## 2025-01-24 DIAGNOSIS — C82.90 FOLLICULAR LYMPHOMA, UNSPECIFIED, UNSPECIFIED SITE: ICD-10-CM

## 2025-01-24 PROCEDURE — 99215 OFFICE O/P EST HI 40 MIN: CPT

## 2025-06-13 ENCOUNTER — OUTPATIENT (OUTPATIENT)
Dept: OUTPATIENT SERVICES | Facility: HOSPITAL | Age: 65
LOS: 1 days | Discharge: HOME | End: 2025-06-13

## 2025-06-13 DIAGNOSIS — Z98.890 OTHER SPECIFIED POSTPROCEDURAL STATES: Chronic | ICD-10-CM

## 2025-06-13 DIAGNOSIS — C85.90 NON-HODGKIN LYMPHOMA, UNSPECIFIED, UNSPECIFIED SITE: ICD-10-CM

## 2025-06-17 ENCOUNTER — APPOINTMENT (OUTPATIENT)
Dept: HEMATOLOGY ONCOLOGY | Facility: CLINIC | Age: 65
End: 2025-06-17
Payer: COMMERCIAL

## 2025-06-17 VITALS
RESPIRATION RATE: 16 BRPM | BODY MASS INDEX: 16.97 KG/M2 | HEART RATE: 54 BPM | DIASTOLIC BLOOD PRESSURE: 67 MMHG | WEIGHT: 94.58 LBS | TEMPERATURE: 97.7 F | OXYGEN SATURATION: 98 % | SYSTOLIC BLOOD PRESSURE: 108 MMHG | HEIGHT: 62.48 IN

## 2025-06-17 PROCEDURE — 99215 OFFICE O/P EST HI 40 MIN: CPT

## (undated) DEVICE — LAP PAD W RING 18 X 18"

## (undated) DEVICE — SUT VICRYL 2-0 27" SH UNDYED

## (undated) DEVICE — BLADE SURGICAL #10 STAINLESS

## (undated) DEVICE — DRAPE LAPAROTOMY TRANSVERSE

## (undated) DEVICE — DRSG MAMMARY SUPPORT XL SIZE 5

## (undated) DEVICE — ELCTR BOVIE TIP BLADE INSULATED 2.75" EDGE

## (undated) DEVICE — ELCTR GROUNDING PAD ADULT COVIDIEN

## (undated) DEVICE — DRSG CURITY GAUZE SPONGE 4 X 4" 12-PLY

## (undated) DEVICE — DRAIN RESERVOIR FOR JACKSON PRATT 100CC CARDINAL

## (undated) DEVICE — DRSG STERISTRIPS 0.5 X 4"

## (undated) DEVICE — SAFETY PIN

## (undated) DEVICE — DRAIN JACKSON PRATT 10MM FLAT FULL 15FR TROCAR

## (undated) DEVICE — DRSG XEROFORM 5 X 9"

## (undated) DEVICE — ELCTR BOVIE TIP BLADE INSULATED 2.8" EDGE WITH SAFETY

## (undated) DEVICE — STAPLER SKIN VISI-STAT 35 WIDE

## (undated) DEVICE — PROTECTOR HEEL / ELBOW FLUFFY

## (undated) DEVICE — ELCTR BOVIE TIP BLADE MEGADYNE E-Z CLEAN 6.5" (LONG)

## (undated) DEVICE — SOL IRR POUR H2O 1500ML

## (undated) DEVICE — DRAPE INSTRUMENT POUCH 6.75" X 11"

## (undated) DEVICE — SUT SILK 2-0 18" FS

## (undated) DEVICE — CANISTER DISPOSABLE THIN WALL 3000CC

## (undated) DEVICE — POSITIONER STRAP ARMBOARD VELCRO TS-30

## (undated) DEVICE — LABELS BLANK W PEN

## (undated) DEVICE — DRSG BENZOIN 0.6CC

## (undated) DEVICE — BLADE SURGICAL #15 CARBON

## (undated) DEVICE — DRAPE 3/4 SHEET 52X76"

## (undated) DEVICE — SUT VICRYL 3-0 27" SH UNDYED

## (undated) DEVICE — PACK MAJOR ABDOMINAL WITH LAP

## (undated) DEVICE — SUT MONOCRYL 4-0 27" PS-2 UNDYED

## (undated) DEVICE — ELCTR BOVIE TIP BLADE INSULATED 6.5" EDGE

## (undated) DEVICE — DRSG COMBINE 5X9"

## (undated) DEVICE — VENODYNE/SCD SLEEVE CALF MEDIUM

## (undated) DEVICE — LIGASURE SMALL JAW